# Patient Record
Sex: FEMALE | Race: WHITE | NOT HISPANIC OR LATINO | Employment: FULL TIME | URBAN - METROPOLITAN AREA
[De-identification: names, ages, dates, MRNs, and addresses within clinical notes are randomized per-mention and may not be internally consistent; named-entity substitution may affect disease eponyms.]

---

## 2017-02-13 ENCOUNTER — OFFICE VISIT (OUTPATIENT)
Dept: URGENT CARE | Facility: RETAIL CLINIC | Age: 47
End: 2017-02-13
Payer: COMMERCIAL

## 2017-02-13 VITALS
TEMPERATURE: 97.6 F | DIASTOLIC BLOOD PRESSURE: 76 MMHG | HEART RATE: 86 BPM | SYSTOLIC BLOOD PRESSURE: 124 MMHG | OXYGEN SATURATION: 98 %

## 2017-02-13 DIAGNOSIS — J02.9 ACUTE PHARYNGITIS, UNSPECIFIED ETIOLOGY: Primary | ICD-10-CM

## 2017-02-13 LAB — S PYO AG THROAT QL IA.RAPID: NORMAL

## 2017-02-13 PROCEDURE — 87081 CULTURE SCREEN ONLY: CPT | Performed by: PHYSICIAN ASSISTANT

## 2017-02-13 PROCEDURE — 99213 OFFICE O/P EST LOW 20 MIN: CPT | Performed by: PHYSICIAN ASSISTANT

## 2017-02-13 PROCEDURE — 87880 STREP A ASSAY W/OPTIC: CPT | Mod: QW | Performed by: PHYSICIAN ASSISTANT

## 2017-02-13 NOTE — NURSING NOTE
"Chief Complaint   Patient presents with     Pharyngitis     off and on for a couple of days       Initial /76  Pulse 86  Temp 97.6  F (36.4  C) (Oral)  SpO2 98% Estimated body mass index is 23.38 kg/(m^2) as calculated from the following:    Height as of 1/29/11: 5' 3\" (1.6 m).    Weight as of 12/23/16: 132 lb (59.9 kg).  Medication Reconciliation: complete   Stephanie Saenz      "

## 2017-02-13 NOTE — PATIENT INSTRUCTIONS
* PHARYNGITIS (Sore Throat),REPORT PENDING    Pharyngitis (sore throat) is often due to a virus, but can also be caused by the  strep  bacteria. This is called  strep throat . Both viral and strep infection can cause throat pain that is worse when swallowing, aching all over with headache and fever. Both types of infections are contagious. They may be spread by coughing, kissing or touching others after touching your mouth or nose, so wash your hands often.  A test has been done to determine whether or not you have strep throat. If it is positive for strep infection you will usually need to take antibiotics. If the test is negative, you probably have a viral pharyngitis, and antibiotic treatment will not help you recover.  HOME CARE:    If your symptoms are severe, rest at home for the first 2-3 days. If you are told that your test is positive for strep, you should be off work and school for the first two days of antibiotic treatment. After that, you will no longer be as contagious.    Children: Use acetaminophen (Tylenol) for fever, fussiness or discomfort. In infants over six months of age, you may use ibuprofen (Children's Motrin) instead of Tylenol. [NOTE: If your child has chronic liver or kidney disease or ever had a stomach ulcer or GI bleeding, talk with your doctor before using these medicines.]   (Aspirin should never be used in anyone under 18 years of age who is ill with a fever. It may cause severe liver damage.)  Adults: You may use acetaminophen (Tylenol) 650 1000 mg every 6 hours or ibuprofen (Motrin, Advil) 600 mg every 6 8 hours with food to control pain, if you are able to take these medicines. [NOTE: If you have chronic liver or kidney disease or ever had a stomach ulcer or GI bleeding, talk with your doctor before using these medicines.]    Throat lozenges or sprays (Chloraseptic and others), or gargling with warm salt water will reduce throat pain. Dissolve 1/2 teaspoon of salt in 1 glass of  warm water. This is especially useful just before meals.     FOLLOW UP with your doctor as advised by our staff if you are not improving over the next week.  GET PROMPT MEDICAL ATTENTION  if any of the following occur:    Fever over 101 F (38.3 C) for more than three days    New or worsening ear pain, sinus pain or headache    Unable to swallow liquids or open your mouth wide due to throat pain    Trouble breathing    Muffled voice    New rash       [id number] [facility], [address] [place] [address], [place] [address]. All rights reserved. This information is not intended as a substitute for professional medical care. Always follow your healthcare professional's instructions.    .............................      Please FOLLOW UP at primary care clinic if not improving, new symptoms, worse or this does not resolve.  [facility]  [phone]

## 2017-02-13 NOTE — PROGRESS NOTES
Chief Complaint   Patient presents with     Pharyngitis     off and on for a couple of days         SUBJECTIVE:   Pt. presenting to [facility] -  with a chief complaint of ST off and on. No URI symptoms or cough  Here with son.  Onset of symptoms gradual  Course of illness is same.    Severity mild  Current and Associated symptoms: sore throat and off and on  Treatment measures tried include None tried.  Predisposing factors include HX of Strep.  Last antibiotic Z pack -sinusitis 12/2016    Pregnancy no  Smoker no  No past medical history on file.  No past surgical history on file.  Patient Active Problem List   Diagnosis     Seasonal allergic rhinitis     Current Outpatient Prescriptions   Medication     fluticasone (FLONASE) 50 MCG/ACT nasal spray     Cyanocobalamin (VITAMIN B12 PO)     Cholecalciferol (VITAMIN D PO)     calcium carbonate 500 MG tablet     Multiple Vitamin (MULTI-VITAMIN) per tablet     No current facility-administered medications for this visit.      OBJECTIVE:  /76  Pulse 86  Temp 97.6  F (36.4  C) (Oral)  SpO2 98%    GENERAL APPEARANCE: cooperative, alert and no distress. Appears well hydrated.  EYES: conjunctiva clear  HENT: Rt ear canal  clear and TM normal   Lt ear canal clear and TM normal   Nose no congestion. no discharge  Mouth without ulcers or lesions. mild erythema. no exudate.   NECK: supple, no palp ant nodes. No  posterior nodes.  RESP: lungs clear to auscultation - no rales, rhonchi or wheezes. Breathing easily.  CV: regular rates and rhythm  ABDOMEN:  soft, nontender, no HSM or masses and bowel sounds normal   SKIN: no suspicious lesions or rashes  no tenderness to palpate over  sinus areas.    Rapid strep neg    ASSESSMENT:  Acute pharyngitis, unspecified etiology      PLAN:  Symptomatic measures   Throat culture pending - will be notified of positive results only.  Salt water gargles  -throat lozenges or honey/lemon tea if soothing   Cool mist vaporizer.    Stay in clean air environment.  > rest.  > fluids.  Contagiousness and hygiene discussed.  Fever and pain  control measures discussed.   If unable to swallow or any breathing difficulty to go to ED     Follow up with your primary care provider if not improving, anytime if worse and if symptoms do not resolve.    AVS given and discussed:  Patient Instructions      * PHARYNGITIS (Sore Throat),REPORT PENDING    Pharyngitis (sore throat) is often due to a virus, but can also be caused by the  strep  bacteria. This is called  strep throat . Both viral and strep infection can cause throat pain that is worse when swallowing, aching all over with headache and fever. Both types of infections are contagious. They may be spread by coughing, kissing or touching others after touching your mouth or nose, so wash your hands often.  A test has been done to determine whether or not you have strep throat. If it is positive for strep infection you will usually need to take antibiotics. If the test is negative, you probably have a viral pharyngitis, and antibiotic treatment will not help you recover.  HOME CARE:    If your symptoms are severe, rest at home for the first 2-3 days. If you are told that your test is positive for strep, you should be off work and school for the first two days of antibiotic treatment. After that, you will no longer be as contagious.    Children: Use acetaminophen (Tylenol) for fever, fussiness or discomfort. In infants over six months of age, you may use ibuprofen (Children's Motrin) instead of Tylenol. [NOTE: If your child has chronic liver or kidney disease or ever had a stomach ulcer or GI bleeding, talk with your doctor before using these medicines.]   (Aspirin should never be used in anyone under 18 years of age who is ill with a fever. It may cause severe liver damage.)  Adults: You may use acetaminophen (Tylenol) 650-1000 mg every 6 hours or ibuprofen (Motrin, Advil) 600 mg every 6-8 hours with  food to control pain, if you are able to take these medicines. [NOTE: If you have chronic liver or kidney disease or ever had a stomach ulcer or GI bleeding, talk with your doctor before using these medicines.]    Throat lozenges or sprays (Chloraseptic and others), or gargling with warm salt water will reduce throat pain. Dissolve 1/2 teaspoon of salt in 1 glass of warm water. This is especially useful just before meals.     FOLLOW UP with your doctor as advised by our staff if you are not improving over the next week.  GET PROMPT MEDICAL ATTENTION  if any of the following occur:    Fever over 101 F (38.3 C) for more than three days    New or worsening ear pain, sinus pain or headache    Unable to swallow liquids or open your mouth wide due to throat pain    Trouble breathing    Muffled voice    New rash       [id number] [facility], [address] [place] [address]. All rights reserved. This information is not intended as a substitute for professional medical care. Always follow your healthcare professional's instructions.    .............................      Please FOLLOW UP at primary care clinic if not improving, new symptoms, worse or this does not resolve.  [facility]  [phone]      PT is comfortable with this plan.  Electronically signed,  DON Ennis PAC

## 2017-02-13 NOTE — MR AVS SNAPSHOT
After Visit Summary   2/13/2017    Julia Flynn    MRN: [id number]           Patient Information     Date Of Birth          1970        Visit Information        Provider Department      2/13/2017 3:00 PM Myrna Ennis PA-C [facility]        Today's Diagnoses     Acute pharyngitis, unspecified etiology    -  1      Care Instructions       * PHARYNGITIS (Sore Throat),REPORT PENDING    Pharyngitis (sore throat) is often due to a virus, but can also be caused by the  strep  bacteria. This is called  strep throat . Both viral and strep infection can cause throat pain that is worse when swallowing, aching all over with headache and fever. Both types of infections are contagious. They may be spread by coughing, kissing or touching others after touching your mouth or nose, so wash your hands often.  A test has been done to determine whether or not you have strep throat. If it is positive for strep infection you will usually need to take antibiotics. If the test is negative, you probably have a viral pharyngitis, and antibiotic treatment will not help you recover.  HOME CARE:    If your symptoms are severe, rest at home for the first 2-3 days. If you are told that your test is positive for strep, you should be off work and school for the first two days of antibiotic treatment. After that, you will no longer be as contagious.    Children: Use acetaminophen (Tylenol) for fever, fussiness or discomfort. In infants over six months of age, you may use ibuprofen (Children's Motrin) instead of Tylenol. [NOTE: If your child has chronic liver or kidney disease or ever had a stomach ulcer or GI bleeding, talk with your doctor before using these medicines.]   (Aspirin should never be used in anyone under 18 years of age who is ill with a fever. It may cause severe liver damage.)  Adults: You may use acetaminophen (Tylenol) 650-1000 mg every 6 hours or ibuprofen (Motrin, Advil) 600 mg  every 6-8 hours with food to control pain, if you are able to take these medicines. [NOTE: If you have chronic liver or kidney disease or ever had a stomach ulcer or GI bleeding, talk with your doctor before using these medicines.]    Throat lozenges or sprays (Chloraseptic and others), or gargling with warm salt water will reduce throat pain. Dissolve 1/2 teaspoon of salt in 1 glass of warm water. This is especially useful just before meals.     FOLLOW UP with your doctor as advised by our staff if you are not improving over the next week.  GET PROMPT MEDICAL ATTENTION  if any of the following occur:    Fever over 101 F (38.3 C) for more than three days    New or worsening ear pain, sinus pain or headache    Unable to swallow liquids or open your mouth wide due to throat pain    Trouble breathing    Muffled voice    New rash       [id number] [facility], [address] [place] [address]. All rights reserved. This information is not intended as a substitute for professional medical care. Always follow your healthcare professional's instructions.    .............................      Please FOLLOW UP at primary care clinic if not improving, new symptoms, worse or this does not resolve.  [facility]  [phone]          Follow-ups after your visit        Who to contact     You can reach your care team any time of the day by calling [phone].  Notification of test results:  If you have an abnormal lab result, we will notify you by phone as soon as possible.         Additional Information About Your Visit        HeyStakshart Information     [email or web address] gives you secure access to your electronic health record. If you see a primary care provider, you can also send messages to your care team and make appointments. If you have questions, please call your primary care clinic.  If you do not have a primary care provider, please call [phone] and they will assist you.        Care EveryWhere ID      This is your Care EveryWhere ID. This could be used by other organizations to access your [place] medical records  [id number]        Your Vitals Were     Pulse Temperature Pulse Oximetry             86 97.6  F (36.4  C) (Oral) 98%          Blood Pressure from Last 3 Encounters:   02/13/17 124/76   12/23/16 106/72   07/21/16 104/60    Weight from Last 3 Encounters:   12/23/16 132 lb (59.9 kg)   07/21/16 132 lb (59.9 kg)   12/23/15 130 lb (59 kg)              We Performed the Following     BETA STREP GROUP A R/O CULTURE     RAPID STREP SCREEN        Primary Care Provider    Doctor Unknown, MD       No address on file        Thank you!     Thank you for choosing [facility]  for your care. Our goal is always to provide you with excellent care. Hearing back from our patients is one way we can continue to improve our services. Please take a few minutes to complete the written survey that you may receive in the mail after your visit with us. Thank you!             Your Updated Medication List - Protect others around you: Learn how to safely use, store and throw away your medicines at www.disposemymeds.org.          This list is accurate as of: 2/13/17  3:20 PM.  Always use your most recent med list.                   Brand Name Dispense Instructions for use    calcium carbonate 500 MG tablet    OS-STACEY 500 mg [place]. [place]     Take 1 tablet by mouth 2 times daily Reported on 2/13/2017       fluticasone 50 MCG/ACT spray    FLONASE    16 g    Spray 1-2 sprays into both nostrils daily       Multi-vitamin Tabs tablet   Generic drug:  multivitamin, therapeutic with minerals      Take 1 tablet by mouth daily Reported on 2/13/2017       VITAMIN B12 PO      Reported on 2/13/2017       VITAMIN D PO      Reported on 2/13/2017

## 2017-02-16 LAB — BETA STREP CONFIRM: NORMAL

## 2017-04-09 ENCOUNTER — OFFICE VISIT (OUTPATIENT)
Dept: URGENT CARE | Facility: RETAIL CLINIC | Age: 47
End: 2017-04-09
Payer: COMMERCIAL

## 2017-04-09 VITALS
OXYGEN SATURATION: 96 % | BODY MASS INDEX: 24.09 KG/M2 | DIASTOLIC BLOOD PRESSURE: 72 MMHG | TEMPERATURE: 98.8 F | RESPIRATION RATE: 16 BRPM | WEIGHT: 136 LBS | SYSTOLIC BLOOD PRESSURE: 109 MMHG | HEART RATE: 75 BPM

## 2017-04-09 DIAGNOSIS — J02.9 ACUTE PHARYNGITIS, UNSPECIFIED ETIOLOGY: ICD-10-CM

## 2017-04-09 DIAGNOSIS — J01.90 ACUTE SINUSITIS WITH COEXISTING CONDITION REQUIRING PROPHYLACTIC TREATMENT: Primary | ICD-10-CM

## 2017-04-09 DIAGNOSIS — J06.9 UPPER RESPIRATORY TRACT INFECTION, UNSPECIFIED TYPE: ICD-10-CM

## 2017-04-09 DIAGNOSIS — R09.81 NASAL CONGESTION: ICD-10-CM

## 2017-04-09 DIAGNOSIS — Z20.818 STREP THROAT EXPOSURE: ICD-10-CM

## 2017-04-09 LAB — S PYO AG THROAT QL IA.RAPID: NORMAL

## 2017-04-09 PROCEDURE — 99213 OFFICE O/P EST LOW 20 MIN: CPT | Performed by: PHYSICIAN ASSISTANT

## 2017-04-09 PROCEDURE — 87081 CULTURE SCREEN ONLY: CPT | Performed by: PHYSICIAN ASSISTANT

## 2017-04-09 PROCEDURE — 87880 STREP A ASSAY W/OPTIC: CPT | Mod: QW | Performed by: PHYSICIAN ASSISTANT

## 2017-04-09 RX ORDER — AZITHROMYCIN 250 MG/1
TABLET, FILM COATED ORAL
Qty: 6 TABLET | Refills: 0 | Status: SHIPPED | OUTPATIENT
Start: 2017-04-09 | End: 2017-10-05

## 2017-04-09 RX ORDER — FLUTICASONE PROPIONATE 50 MCG
1-2 SPRAY, SUSPENSION (ML) NASAL DAILY
Qty: 1 BOTTLE | Refills: 1 | Status: SHIPPED | OUTPATIENT
Start: 2017-04-09 | End: 2017-11-15

## 2017-04-09 ASSESSMENT — PAIN SCALES - GENERAL: PAINLEVEL: MODERATE PAIN (5)

## 2017-04-09 NOTE — NURSING NOTE
"Chief Complaint   Patient presents with     Pharyngitis     1 week     Cough     Sinus Problem       Initial /72  Pulse 75  Temp 98.8  F (37.1  C) (Tympanic)  Resp 16  Wt 136 lb (61.7 kg)  SpO2 96%  BMI 24.09 kg/m2 Estimated body mass index is 24.09 kg/(m^2) as calculated from the following:    Height as of 1/29/11: 5' 3\" (1.6 m).    Weight as of this encounter: 136 lb (61.7 kg).  Medication Reconciliation: complete    "

## 2017-04-09 NOTE — PROGRESS NOTES
"  Chief Complaint   Patient presents with     Pharyngitis     1 week     Cough     Sinus Problem         SUBJECTIVE:   Pt. presenting to [facility] -  with a chief complaint of ST. Minimal cough. URI symptoms with > sinus HA and pressure.  Hx of asthma no.  Onset of symptoms gradual  Course of illness is worsening.    Severity moderate  Current and Associated symptoms: nasal congestion, \"cold symptoms\", sore throat, facial pain/pressure, headache and malaise  Treatment measures tried include Fluids, OTC meds and Rest.  Predisposing factors include strep exposure.  Last antibiotic 12/2016    Pregnancy no  Smoker no  No past medical history on file.  No past surgical history on file.  Patient Active Problem List   Diagnosis     Seasonal allergic rhinitis     Current Outpatient Prescriptions   Medication     fluticasone (FLONASE) 50 MCG/ACT nasal spray     Cyanocobalamin (VITAMIN B12 PO)     Cholecalciferol (VITAMIN D PO)     calcium carbonate 500 MG tablet     Multiple Vitamin (MULTI-VITAMIN) per tablet     No current facility-administered medications for this visit.      OBJECTIVE:  /72  Pulse 75  Temp 98.8  F (37.1  C) (Tympanic)  Resp 16  Wt 136 lb (61.7 kg)  SpO2 96%  BMI 24.09 kg/m2    GENERAL APPEARANCE: cooperative, alert and no distress. Appears well hydrated.  EYES: conjunctiva clear  HENT: Rt ear canal  clear and TM normal   Lt ear canal clear and TM normal   Nose mod congestion. thick discharge  Mouth without ulcers or lesions. mild erythema. no exudate.  NECK: supple, few small shoddy NT ant nodes. No  posterior nodes.  RESP: lungs clear to auscultation - no rales, rhonchi or wheezes. Breathing easily.  CV: regular rates and rhythm  ABDOMEN:  soft, nontender, no HSM or masses and bowel sounds normal   SKIN: no suspicious lesions or rashes  some tenderness to palpate over sarah max sinus areas.    Rapid strep neg    ASSESSMENT:     Acute pharyngitis, unspecified etiology  Upper " respiratory tract infection, unspecified type  Strep throat exposure  Acute sinusitis with coexisting condition requiring prophylactic treatment  Nasal congestion      PLAN:  Symptomatic measures   Prescriptions as below. Discussed indications, dosing, side affects and adverse reactions of medications with  Patient -Zpack -tolerated in past (no history of arrhythmias) and restart Flonase  Eat yogurt daily or take a probiotic supplement when on antibiotics.  OTC cough suppressant/expectorant discussed  Salt water gargles  -throat lozenges or honey/lemon tea if soothing   saline nasal spray for  nasal congestion   Cool mist vaporizer.   Stay in clean air environment.  > rest.  > fluids.  Contagiousness and hygiene discussed.  Fever and pain  control measures discussed.   If unable to swallow or any breathing difficulty to go to ED   AVS given and discussed:  Patient Instructions     Please FOLLOW UP at primary care clinic if not improving, new symptoms, worse or this does not resolve.          Pt is comfortable with this plan.  Electronically signed,  DON Ennis, PAC

## 2017-04-09 NOTE — PATIENT INSTRUCTIONS
Please FOLLOW UP at primary care clinic if not improving, new symptoms, worse or this does not resolve.

## 2017-04-09 NOTE — MR AVS SNAPSHOT
After Visit Summary   4/9/2017    Julia Flynn    MRN: [id number]           Patient Information     Date Of Birth          1970        Visit Information        Provider Department      4/9/2017 9:00 AM Myrna Ennis PA-C [facility]        Today's Diagnoses     Acute sinusitis with coexisting condition requiring prophylactic treatment    -  1    Acute pharyngitis, unspecified etiology        Upper respiratory tract infection, unspecified type        Strep throat exposure        Nasal congestion          Care Instructions      Please FOLLOW UP at primary care clinic if not improving, new symptoms, worse or this does not resolve.          Follow-ups after your visit        Who to contact     You can reach your care team any time of the day by calling [phone].  Notification of test results:  If you have an abnormal lab result, we will notify you by phone as soon as possible.         Additional Information About Your Visit        MyChart Information     [employer] gives you secure access to your electronic health record. If you see a primary care provider, you can also send messages to your care team and make appointments. If you have questions, please call your primary care clinic.  If you do not have a primary care provider, please call [phone] and they will assist you.        Care EveryWhere ID     This is your Care EveryWhere ID. This could be used by other organizations to access your [place] medical records  [id number]        Your Vitals Were     Pulse Temperature Respirations Pulse Oximetry BMI (Body Mass Index)       75 98.8  F (37.1  C) (Tympanic) 16 96% 24.09 kg/m2        Blood Pressure from Last 3 Encounters:   04/09/17 109/72   02/13/17 124/76   12/23/16 106/72    Weight from Last 3 Encounters:   04/09/17 136 lb (61.7 kg)   12/23/16 132 lb (59.9 kg)   07/21/16 132 lb (59.9 kg)              We Performed the Following     BETA STREP GROUP A R/O CULTURE      RAPID STREP SCREEN          Today's Medication Changes          These changes are accurate as of: 4/9/17  9:26 AM.  If you have any questions, ask your nurse or doctor.               Start taking these medicines.        Dose/Directions    azithromycin 250 MG tablet   Commonly known as:  ZITHROMAX   Used for:  Strep throat exposure, Acute sinusitis with coexisting condition requiring prophylactic treatment   Started by:  Myrna Ennis PA-C        Two tablets first day, then one tablet daily for four days.   Quantity:  6 tablet   Refills:  0         These medicines have changed or have updated prescriptions.        Dose/Directions    * fluticasone 50 MCG/ACT spray   Commonly known as:  FLONASE   This may have changed:  Another medication with the same name was added. Make sure you understand how and when to take each.   Used for:  Nasal sinus congestion   Changed by:  Myrna Ennis PA-C        Dose:  1-2 spray   Spray 1-2 sprays into both nostrils daily   Quantity:  16 g   Refills:  1       * fluticasone 50 MCG/ACT spray   Commonly known as:  FLONASE   This may have changed:  You were already taking a medication with the same name, and this prescription was added. Make sure you understand how and when to take each.   Used for:  Nasal congestion   Changed by:  Myrna Ennis PA-C        Dose:  1-2 spray   Spray 1-2 sprays into both nostrils daily   Quantity:  1 Bottle   Refills:  1       * Notice:  This list has 2 medication(s) that are the same as other medications prescribed for you. Read the directions carefully, and ask your doctor or other care provider to review them with you.         Where to get your medicines      These medications were sent to [facility] - [place], [place] - [address]  [address], [facility] [address]     Phone:  [phone]     azithromycin 250 MG tablet    fluticasone 50 MCG/ACT spray                Primary Care Provider    Doctor Unknown, MD       No address on  file        Thank you!     Thank you for choosing [facility]  for your care. Our goal is always to provide you with excellent care. Hearing back from our patients is one way we can continue to improve our services. Please take a few minutes to complete the written survey that you may receive in the mail after your visit with us. Thank you!             Your Updated Medication List - Protect others around you: Learn how to safely use, store and throw away your medicines at www.disposemymeds.org.          This list is accurate as of: 4/9/17  9:26 AM.  Always use your most recent med list.                   Brand Name Dispense Instructions for use    azithromycin 250 MG tablet    ZITHROMAX    6 tablet    Two tablets first day, then one tablet daily for four days.       calcium carbonate 500 MG tablet    OS-STACEY 500 mg [place]. [place]     Take 1 tablet by mouth 2 times daily Reported on 2/13/2017       * fluticasone 50 MCG/ACT spray    FLONASE    16 g    Spray 1-2 sprays into both nostrils daily       * fluticasone 50 MCG/ACT spray    FLONASE    1 Bottle    Spray 1-2 sprays into both nostrils daily       Multi-vitamin Tabs tablet   Generic drug:  multivitamin, therapeutic with minerals      Take 1 tablet by mouth daily Reported on 2/13/2017       VITAMIN B12 PO      Reported on 2/13/2017       VITAMIN D PO      Reported on 2/13/2017       * Notice:  This list has 2 medication(s) that are the same as other medications prescribed for you. Read the directions carefully, and ask your doctor or other care provider to review them with you.

## 2017-04-11 LAB — BETA STREP CONFIRM: NORMAL

## 2017-09-03 ENCOUNTER — HEALTH MAINTENANCE LETTER (OUTPATIENT)
Age: 47
End: 2017-09-03

## 2017-10-05 ENCOUNTER — OFFICE VISIT (OUTPATIENT)
Dept: URGENT CARE | Facility: RETAIL CLINIC | Age: 47
End: 2017-10-05
Payer: COMMERCIAL

## 2017-10-05 VITALS
SYSTOLIC BLOOD PRESSURE: 111 MMHG | OXYGEN SATURATION: 97 % | TEMPERATURE: 98.6 F | DIASTOLIC BLOOD PRESSURE: 76 MMHG | HEART RATE: 77 BPM

## 2017-10-05 DIAGNOSIS — J02.9 ACUTE PHARYNGITIS, UNSPECIFIED ETIOLOGY: Primary | ICD-10-CM

## 2017-10-05 DIAGNOSIS — R09.81 NASAL CONGESTION: ICD-10-CM

## 2017-10-05 DIAGNOSIS — J01.90 ACUTE SINUSITIS WITH COEXISTING CONDITION REQUIRING PROPHYLACTIC TREATMENT: ICD-10-CM

## 2017-10-05 LAB — S PYO AG THROAT QL IA.RAPID: NORMAL

## 2017-10-05 PROCEDURE — 99213 OFFICE O/P EST LOW 20 MIN: CPT | Performed by: NURSE PRACTITIONER

## 2017-10-05 PROCEDURE — 87081 CULTURE SCREEN ONLY: CPT | Performed by: NURSE PRACTITIONER

## 2017-10-05 PROCEDURE — 87880 STREP A ASSAY W/OPTIC: CPT | Mod: QW | Performed by: NURSE PRACTITIONER

## 2017-10-05 RX ORDER — AZITHROMYCIN 250 MG/1
TABLET, FILM COATED ORAL
Qty: 6 TABLET | Refills: 0 | Status: SHIPPED | OUTPATIENT
Start: 2017-10-05 | End: 2017-10-10

## 2017-10-05 NOTE — PROGRESS NOTES
[facility] clinic note    SUBJECTIVE:    Julia Flynn is a 47 year old female who presents to [facility] clinic with the following symptoms:  Facial pain for seven days or more  Purulent nasal discharge  Failure of over-the-counter nasal decongestants or other medications  Headache  History of sinusitis in the past  Mild to moderate facial swelling  Pain in the teeth or near a certain tooth  Eyes burning  Chills  Cough productive  Felt feverish  Malaise  Chest congestion    The patient denies a history of:  Fever >102.5  Orbital pain  Periorbital swelling or erythema  Severe facial swelling or erythema  Severe neck pain  Vision changes    PAST MEDICAL HISTORY: No past medical history on file.    PAST SURGICAL HISTORY: No past surgical history on file.    FAMILY HISTORY: No family history on file.    SOCIAL HISTORY:   Social History   Substance Use Topics     Smoking status: Never Smoker     Smokeless tobacco: Never Used     Alcohol use No       Current Outpatient Prescriptions   Medication     GuaiFENesin (MUCINEX PO)     Pseudoephedrine-DM-GG (ROBITUSSIN CF PO)     Cyanocobalamin (VITAMIN B12 PO)     Cholecalciferol (VITAMIN D PO)     calcium carbonate 500 MG tablet     Multiple Vitamin (MULTI-VITAMIN) per tablet     fluticasone (FLONASE) 50 MCG/ACT spray     fluticasone (FLONASE) 50 MCG/ACT nasal spray     No current facility-administered medications for this visit.        OBJECTIVE:  This patient appears today in in moderate distress.  Vitals:    10/05/17 0915   BP: 111/76   Pulse: 77   Temp: 98.6  F (37  C)   TempSrc: Oral   SpO2: 97%     HEENT:  Facial tenderness located over the maxillary sinus region       Tenderness is bilateral.  Purulent nasal discharge present from both sides.  Tympanic membranes are clear and without bulging or erythema  Extraoccular movements are free and intact  Sclera, cornea and conjunctiva are clear  No proptosis  No significant  periorbital edema or erythema  Throat is clear without significant erythema, tonsillar swelling or exudates  NECK:  Soft and supple without significant tenderness or adenopathy  RESP:  Clear to auscultation without wheezing, rales or ronchi    ASSESSMENT:     Acute pharyngitis, unspecified etiology  Nasal congestion  Acute sinusitis with coexisting condition requiring prophylactic treatment      PLAN:  Zpack  Afrin nasal spray as needed for up to 3 days.  Apply warm facial packs for 5-10 minutes three times daily.  Drink plenty of fluids- 6 to 10 glasses of liquids each day.  Elevate head of the bed.  Increase the humidity level in the home.  Rest.  Saline drops or nasal sprays as needed.  Take warm, steamy showers to reduce congestion.  Tylenol or ibuprofen as needed for discomfort.  Contact primary care clinic for increasing pain, high fever, vision changes, worsening symptoms, or no relief from symptoms after 7-10 days.  May drink tea /c honey to sooth throat.    Symptomatic treatment or other home remedies as discussed & reviewed.   Use of a nasal flush discussed with Julia Flynn as a good treatment option.   OTC Mucinex (or generic) may help to loosen congestion as well.  Rest as needed.  Avoid items which you are or maybe allergic.  Add moisture to the air with a humidifier or a vaporizer &/or inhale steam from a basin of hot water or shower.  Follow up at:  [facility] [phone]    Easton Stubbs MSN, APRN, Family NP-C  [facility]

## 2017-10-05 NOTE — MR AVS SNAPSHOT
After Visit Summary   10/5/2017    Julia Flynn    MRN: [id number]           Patient Information     Date Of Birth          1970        Visit Information        Provider Department      10/5/2017 9:10 AM Easton Stubbs APRN [facility]        Today's Diagnoses     Acute pharyngitis, unspecified etiology    -  1    Nasal congestion        Acute sinusitis with coexisting condition requiring prophylactic treatment           Follow-ups after your visit        Who to contact     You can reach your care team any time of the day by calling [phone].  Notification of test results:  If you have an abnormal lab result, we will notify you by phone as soon as possible.         Additional Information About Your Visit        MyChart Information     [employer] gives you secure access to your electronic health record. If you see a primary care provider, you can also send messages to your care team and make appointments. If you have questions, please call your primary care clinic.  If you do not have a primary care provider, please call [phone] and they will assist you.        Care EveryWhere ID     This is your Care EveryWhere ID. This could be used by other organizations to access your [place] medical records  [id number]        Your Vitals Were     Pulse Temperature Pulse Oximetry             77 98.6  F (37  C) (Oral) 97%          Blood Pressure from Last 3 Encounters:   10/05/17 111/76   04/09/17 109/72   02/13/17 124/76    Weight from Last 3 Encounters:   04/09/17 136 lb (61.7 kg)   12/23/16 132 lb (59.9 kg)   07/21/16 132 lb (59.9 kg)              We Performed the Following     BETA STREP GROUP A R/O CULTURE     RAPID STREP SCREEN          Today's Medication Changes          These changes are accurate as of: 10/5/17  9:44 AM.  If you have any questions, ask your nurse or doctor.               Start taking these medicines.        Dose/Directions    azithromycin 250 MG  tablet   Commonly known as:  ZITHROMAX   Used for:  Acute sinusitis with coexisting condition requiring prophylactic treatment   Started by:  Easton Stubbs, MARIANNE CNP        Take 2 tablets today, then take 1 tablet for the next 4 days.   Quantity:  6 tablet   Refills:  0            Where to get your medicines      These medications were sent to [facility] - [place], [place] - [address]  [address], [facility] [address]     Phone:  [phone]     azithromycin 250 MG tablet                Primary Care Provider Office Phone # Fax #    [place] [facility] [phone] [address] [facility] [address]        Equal Access to Services     [facility] HospitalEST[facility]O : Hadii tiarra guidry hadalondra Hdez, waaxda luqadaha, qaybta kaalmada adedianayada, magaly handy . So [facility] [phone].    ATENCIÓN: Si habla español, tiene a andino disposición servicios gratuitos de asistencia lingüística. [facility] [phone].    We comply with applicable federal civil rights laws and [place] laws. We do not discriminate on the basis of race, color, national origin, age, disability, sex, sexual orientation, or gender identity.            Thank you!     Thank you for choosing [facility]  for your care. Our goal is always to provide you with excellent care. Hearing back from our patients is one way we can continue to improve our services. Please take a few minutes to complete the written survey that you may receive in the mail after your visit with us. Thank you!             Your Updated Medication List - Protect others around you: Learn how to safely use, store and throw away your medicines at www.disposemymeds.org.          This list is accurate as of: 10/5/17  9:44 AM.  Always use your most recent med list.                   Brand Name Dispense Instructions for use Diagnosis    azithromycin 250 MG tablet    ZITHROMAX    6 tablet    Take 2 tablets today, then take 1  tablet for the next 4 days.    Acute sinusitis with coexisting condition requiring prophylactic treatment       calcium carbonate 1250 MG tablet    OS-STACEY 500 mg [place]. [place]     Take 1 tablet by mouth 2 times daily Reported on 2/13/2017        * fluticasone 50 MCG/ACT spray    FLONASE    16 g    Spray 1-2 sprays into both nostrils daily    Nasal sinus congestion       * fluticasone 50 MCG/ACT spray    FLONASE    1 Bottle    Spray 1-2 sprays into both nostrils daily    Nasal congestion       MUCINEX PO           Multi-vitamin Tabs tablet   Generic drug:  multivitamin, therapeutic with minerals      Take 1 tablet by mouth daily Reported on 2/13/2017        ROBITUSSIN CF PO           VITAMIN B12 PO      Reported on 2/13/2017        VITAMIN D PO      Reported on 2/13/2017        * Notice:  This list has 2 medication(s) that are the same as other medications prescribed for you. Read the directions carefully, and ask your doctor or other care provider to review them with you.

## 2017-10-05 NOTE — NURSING NOTE
"Chief Complaint   Patient presents with     Cough     x about a week     Sinus Problem     Pharyngitis       Initial /76  Pulse 77  Temp 98.6  F (37  C) (Oral)  SpO2 97% Estimated body mass index is 24.09 kg/(m^2) as calculated from the following:    Height as of 1/29/11: 5' 3\" (1.6 m).    Weight as of 4/9/17: 136 lb (61.7 kg).  Medication Reconciliation: complete     Stephanie Saenz      "

## 2017-10-07 LAB — BETA STREP CONFIRM: NORMAL

## 2017-11-02 ENCOUNTER — OFFICE VISIT (OUTPATIENT)
Dept: URGENT CARE | Facility: RETAIL CLINIC | Age: 47
End: 2017-11-02
Payer: COMMERCIAL

## 2017-11-02 VITALS
TEMPERATURE: 98 F | SYSTOLIC BLOOD PRESSURE: 106 MMHG | HEART RATE: 91 BPM | DIASTOLIC BLOOD PRESSURE: 72 MMHG | OXYGEN SATURATION: 98 %

## 2017-11-02 DIAGNOSIS — R05.9 COUGH: ICD-10-CM

## 2017-11-02 DIAGNOSIS — J02.9 ACUTE PHARYNGITIS, UNSPECIFIED ETIOLOGY: Primary | ICD-10-CM

## 2017-11-02 LAB — S PYO AG THROAT QL IA.RAPID: NORMAL

## 2017-11-02 PROCEDURE — 99213 OFFICE O/P EST LOW 20 MIN: CPT | Performed by: NURSE PRACTITIONER

## 2017-11-02 PROCEDURE — 87081 CULTURE SCREEN ONLY: CPT | Performed by: NURSE PRACTITIONER

## 2017-11-02 PROCEDURE — 87880 STREP A ASSAY W/OPTIC: CPT | Mod: QW | Performed by: NURSE PRACTITIONER

## 2017-11-02 NOTE — MR AVS SNAPSHOT
After Visit Summary   11/2/2017    Julia Flynn    MRN: [id number]           Patient Information     Date Of Birth          1970        Visit Information        Provider Department      11/2/2017 6:40 PM Easton Stubbs APRN [facility]        Today's Diagnoses     Acute pharyngitis, unspecified etiology    -  1    Cough           Follow-ups after your visit        Who to contact     You can reach your care team any time of the day by calling [phone].  Notification of test results:  If you have an abnormal lab result, we will notify you by phone as soon as possible.         Additional Information About Your Visit        MyChart Information     [employer] gives you secure access to your electronic health record. If you see a primary care provider, you can also send messages to your care team and make appointments. If you have questions, please call your primary care clinic.  If you do not have a primary care provider, please call [phone] and they will assist you.        Care EveryWhere ID     This is your Care EveryWhere ID. This could be used by other organizations to access your [place] medical records  [id number]        Your Vitals Were     Pulse Temperature Pulse Oximetry             91 98  F (36.7  C) (Oral) 98%          Blood Pressure from Last 3 Encounters:   11/02/17 106/72   10/05/17 111/76   04/09/17 109/72    Weight from Last 3 Encounters:   04/09/17 136 lb (61.7 kg)   12/23/16 132 lb (59.9 kg)   07/21/16 132 lb (59.9 kg)              We Performed the Following     BETA STREP GROUP A R/O CULTURE     RAPID STREP SCREEN        Primary Care Provider Office Phone # Fax #    [facility] [phone] [address] [facility] [address]        Equal Access to Services     GEOFF AYALA : Trudy Hdez, stephen rouse, ivy patel, magaly blackwell. So [facility]  [phone].    ATENCIÓN: Si aldo coe, tiene a andino disposición servicios gratuitos de asistencia lingüística. Roro tejeda [phone].    We comply with applicable federal civil rights laws and [place] laws. We do not discriminate on the basis of race, color, national origin, age, disability, sex, sexual orientation, or gender identity.            Thank you!     Thank you for choosing [facility]  for your care. Our goal is always to provide you with excellent care. Hearing back from our patients is one way we can continue to improve our services. Please take a few minutes to complete the written survey that you may receive in the mail after your visit with us. Thank you!             Your Updated Medication List - Protect others around you: Learn how to safely use, store and throw away your medicines at www.disposemymeds.org.          This list is accurate as of: 11/2/17  7:33 PM.  Always use your most recent med list.                   Brand Name Dispense Instructions for use Diagnosis    calcium carbonate 1250 MG tablet    OS-STACEY 500 mg [place]. Ca     Take 1 tablet by mouth 2 times daily Reported on 2/13/2017        * fluticasone 50 MCG/ACT spray    FLONASE    16 g    Spray 1-2 sprays into both nostrils daily    Nasal sinus congestion       * fluticasone 50 MCG/ACT spray    FLONASE    1 Bottle    Spray 1-2 sprays into both nostrils daily    Nasal congestion       MUCINEX PO           Multi-vitamin Tabs tablet   Generic drug:  multivitamin, therapeutic with minerals      Take 1 tablet by mouth daily Reported on 2/13/2017        ROBITUSSIN CF PO           VITAMIN B12 PO      Reported on 2/13/2017        VITAMIN D PO      Reported on 2/13/2017        * Notice:  This list has 2 medication(s) that are the same as other medications prescribed for you. Read the directions carefully, and ask your doctor or other care provider to review them with you.

## 2017-11-03 NOTE — PROGRESS NOTES
[facility] clinic note    SUBJECTIVE:  Julia Flynn is a 47 year old female who presents to [facility] clinic with chief complaint of sore throat/cough.    Onset of symptoms was 3 week(s) ago.    Course of illness: gradual onset and worsening.    Severity moderate  Course of illness:  Current and Associated symptoms: chills, sweats, runny nose, stuffy nose, cough - non-productive, sore throat, hoarse voice, facial pain/pressure, headache, body aches, fatigue and 1 day of stomach upset.  Treatment measures tried at home include OTC Cough med.  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    Current Outpatient Prescriptions   Medication     GuaiFENesin (MUCINEX PO)     Pseudoephedrine-DM-GG (ROBITUSSIN CF PO)     fluticasone (FLONASE) 50 MCG/ACT spray     fluticasone (FLONASE) 50 MCG/ACT nasal spray     Cyanocobalamin (VITAMIN B12 PO)     Cholecalciferol (VITAMIN D PO)     calcium carbonate 500 MG tablet     Multiple Vitamin (MULTI-VITAMIN) per tablet     No current facility-administered medications for this visit.      PAST MEDICAL HISTORY: No past medical history on file.    PAST SURGICAL HISTORY: No past surgical history on file.    FAMILY HISTORY: No family history on file.    SOCIAL HISTORY:   Social History   Substance Use Topics     Smoking status: Never Smoker     Smokeless tobacco: Never Used     Alcohol use No       ROS:  Review of systems negative except as stated above.    OBJECTIVE:   Vitals:    11/02/17 1907   BP: 106/72   Pulse: 91   Temp: 98  F (36.7  C)   TempSrc: Oral   SpO2: 98%     GENERAL APPEARANCE: alert, active, mild distress, moderate distress and cooperative  EYES: EOMI,  PERRL, conjunctiva clear  HENT: ear canals and TM's normal.  Nose normal.  oral mucous membranes moist, no erythema noted  NECK: supple, non-tender to palpation, no adenopathy noted  RESP: lungs clear to auscultation - no rales, rhonchi or wheezes  CV:  regular rates and rhythm, normal S1 S2, no murmur noted  ABDOMEN:  soft, nontender, no HSM or masses and bowel sounds normal  SKIN: no suspicious lesions or rashes    Rapid Strep test is negative; await throat culture results.    ASSESSMENT:     Acute pharyngitis, unspecified etiology  Cough      PLAN:   Outpatient Encounter Prescriptions as of 11/2/2017   Medication Sig Dispense Refill     GuaiFENesin (MUCINEX PO)        Pseudoephedrine-DM-GG (ROBITUSSIN CF PO)        fluticasone (FLONASE) 50 MCG/ACT spray Spray 1-2 sprays into both nostrils daily 1 Bottle 1     fluticasone (FLONASE) 50 MCG/ACT nasal spray Spray 1-2 sprays into both nostrils daily 16 g 1     Cyanocobalamin (VITAMIN B12 PO) Reported on 2/13/2017       Cholecalciferol (VITAMIN D PO) Reported on 2/13/2017       calcium carbonate 500 MG tablet Take 1 tablet by mouth 2 times daily Reported on 2/13/2017       Multiple Vitamin (MULTI-VITAMIN) per tablet Take 1 tablet by mouth daily Reported on 2/13/2017       No facility-administered encounter medications on file as of 11/2/2017.      If not improving Follow up at:  [facility] [phone]  Encourage good hydration (mainly water), may drink tea /c honey, warm chicken broth to sooth throat.  Soft foods may be preferred for several days.  Symptomatic treatment with warm Na+ H2O gargles, OTC analgesic, etc. discussed.   Strep culture pending.   Julia Flynn told positive cultures called only.  Rest as needed.  Follow-up with primary care provider if not improving.    If difficulty breathing or swallowing be seen immediately in the ED.    Easton Stubbs MSN, APRN, Family NP-C  [facility]

## 2017-11-03 NOTE — NURSING NOTE
"Chief Complaint   Patient presents with     Cough     x 3 weeks     Pharyngitis       Initial /72  Pulse 91  Temp 98  F (36.7  C) (Oral)  SpO2 98% Estimated body mass index is 24.09 kg/(m^2) as calculated from the following:    Height as of 1/29/11: 5' 3\" (1.6 m).    Weight as of 4/9/17: 136 lb (61.7 kg).  Medication Reconciliation: complete   Stephanie Saenz      "

## 2017-11-04 LAB — BETA STREP CONFIRM: NORMAL

## 2017-11-15 ENCOUNTER — OFFICE VISIT (OUTPATIENT)
Dept: FAMILY MEDICINE | Facility: CLINIC | Age: 47
End: 2017-11-15
Payer: COMMERCIAL

## 2017-11-15 VITALS
DIASTOLIC BLOOD PRESSURE: 60 MMHG | SYSTOLIC BLOOD PRESSURE: 120 MMHG | HEART RATE: 80 BPM | TEMPERATURE: 97.7 F | BODY MASS INDEX: 23.72 KG/M2 | WEIGHT: 133.9 LBS

## 2017-11-15 DIAGNOSIS — J20.9 ACUTE BRONCHITIS, UNSPECIFIED ORGANISM: Primary | ICD-10-CM

## 2017-11-15 PROCEDURE — 99203 OFFICE O/P NEW LOW 30 MIN: CPT | Performed by: NURSE PRACTITIONER

## 2017-11-15 RX ORDER — ALBUTEROL SULFATE 90 UG/1
2 AEROSOL, METERED RESPIRATORY (INHALATION) EVERY 6 HOURS PRN
Qty: 1 INHALER | Refills: 0 | Status: SHIPPED | OUTPATIENT
Start: 2017-11-15 | End: 2017-12-05

## 2017-11-15 RX ORDER — BENZONATATE 200 MG/1
200 CAPSULE ORAL 3 TIMES DAILY PRN
Qty: 30 CAPSULE | Refills: 0 | Status: SHIPPED | OUTPATIENT
Start: 2017-11-15 | End: 2017-12-05

## 2017-11-15 NOTE — MR AVS SNAPSHOT
After Visit Summary   11/15/2017    Julia Flynn    MRN: [id number]           Patient Information     Date Of Birth          1970        Visit Information        Provider Department      11/15/2017 2:45 PM Lizette Jeffers APRN CNP [facility]        Today's Diagnoses     Acute bronchitis, unspecified organism    -  1       Follow-ups after your visit        Your next 10 appointments already scheduled     Dec 05, 2017  4:45 PM CST   PHYSICAL with MARIANNE Izaguirre C[facility]   [facility]    [address] [place] [address] [id number] [phone]              Who to contact     If you have questions or need follow up information about today's clinic visit or your schedule please contact [facility] directly at [phone].  Normal or non-critical lab and imaging results will be communicated to you by MyChart, letter or phone within 4 business days after the clinic has received the results. If you do not hear from us within 7 days, please contact the clinic through MyChart or phone. If you have a critical or abnormal lab result, we will notify you by phone as soon as possible.  Submit refill requests through [employer] or call your pharmacy and they will forward the refill request to us. Please allow 3 business days for your refill to be completed.          Additional Information About Your Visit        MyChart Information     [employer] gives you secure access to your electronic health record. If you see a primary care provider, you can also send messages to your care team and make appointments. If you have questions, please call your primary care clinic.  If you do not have a primary care provider, please call [phone] and they will assist you.        Care EveryWhere ID     This is your Care EveryWhere ID. This could be used by other organizations to access your [place] medical records  [id number]        Your  Vitals Were     Pulse Temperature BMI (Body Mass Index)             80 97.7  F (36.5  C) (Tympanic) 23.72 kg/m2          Blood Pressure from Last 3 Encounters:   11/15/17 120/60   11/02/17 106/72   10/05/17 111/76    Weight from Last 3 Encounters:   11/15/17 133 lb 14.4 oz (60.7 kg)   04/09/17 136 lb (61.7 kg)   12/23/16 132 lb (59.9 kg)              Today, you had the following     No orders found for display         Today's Medication Changes          These changes are accurate as of: 11/15/17  3:21 PM.  If you have any questions, ask your nurse or doctor.               Start taking these medicines.        Dose/Directions    albuterol 108 (90 BASE) MCG/ACT Inhaler   Commonly known as:  PROAIR HFA/PROVENTIL HFA/VENTOLIN HFA   Used for:  Acute bronchitis, unspecified organism   Started by:  Lizette Jeffers APRN CNP        Dose:  2 puff   Inhale 2 puffs into the lungs every 6 hours as needed for shortness of breath / dyspnea or wheezing   Quantity:  1 Inhaler   Refills:  0       benzonatate 200 MG capsule   Commonly known as:  TESSALON   Used for:  Acute bronchitis, unspecified organism   Started by:  Lizette Jeffers APRN CNP        Dose:  200 mg   Take 1 capsule (200 mg) by mouth 3 times daily as needed for cough   Quantity:  30 capsule   Refills:  0            Where to get your medicines      These medications were [address]  [address] [facility] [address]     Phone:  [phone]     albuterol 108 (90 BASE) MCG/ACT Inhaler    benzonatate 200 MG capsule                Primary Care Provider Office Phone # Fax #    [place] [facility] [phone]       [address] [facility] [address]        Equal Access to Services     GEOFF AYALA AH: Trudy Hdez, stephen rouse, qabrunilda kaalana patel, magaly blackwell. So [facility] [phone].    ATENCIÓN: Si habla español, tiene a andino disposición servicios  nubia de asistencia lingüística. Roro tejeda [phone].    We comply with applicable federal civil rights laws and [place] laws. We do not discriminate on the basis of race, color, national origin, age, disability, sex, sexual orientation, or gender identity.            Thank you!     Thank you for choosing [facility]  for your care. Our goal is always to provide you with excellent care. Hearing back from our patients is one way we can continue to improve our services. Please take a few minutes to complete the written survey that you may receive in the mail after your visit with us. Thank you!             Your Updated Medication List - Protect others around you: Learn how to safely use, store and throw away your medicines at www.disposemymeds.org.          This list is accurate as of: 11/15/17  3:21 PM.  Always use your most recent med list.                   Brand Name Dispense Instructions for use Diagnosis    albuterol 108 (90 BASE) MCG/ACT Inhaler    PROAIR HFA/PROVENTIL HFA/VENTOLIN HFA    1 Inhaler    Inhale 2 puffs into the lungs every 6 hours as needed for shortness of breath / dyspnea or wheezing    Acute bronchitis, unspecified organism       benzonatate 200 MG capsule    TESSALON    30 capsule    Take 1 capsule (200 mg) by mouth 3 times daily as needed for cough    Acute bronchitis, unspecified organism       calcium carbonate 1250 MG tablet    OS-STACEY 500 mg [place]. Ca     Take 1 tablet by mouth 2 times daily Reported on 2/13/2017        MUCINEX PO           Multi-vitamin Tabs tablet   Generic drug:  multivitamin, therapeutic with minerals      Take 1 tablet by mouth daily Reported on 2/13/2017        ROBITUSSIN CF PO           VITAMIN B12 PO      Reported on 2/13/2017        VITAMIN D PO      Reported on 2/13/2017

## 2017-11-15 NOTE — NURSING NOTE
"Chief Complaint   Patient presents with     Cough       Initial There were no vitals taken for this visit. Estimated body mass index is 24.09 kg/(m^2) as calculated from the following:    Height as of 1/29/11: 5' 3\" (1.6 m).    Weight as of 4/9/17: 136 lb (61.7 kg).  Medication Reconciliation: complete  "

## 2017-11-15 NOTE — PROGRESS NOTES
SUBJECTIVE:   Julia Flynn is a 47 year old female who presents to clinic today for the following health issues:      Concern - cough    Onset: Oct 5    Description:   Productive cough, yellow, green    Intensity: mild    Progression of Symptoms:  same    Accompanying Signs & Symptoms:  none    Previous history of similar problem:   Seen 11/2/17 was on Zpak, helped for a short time. Sx returning    Precipitating factors:   Worsened by: none    Alleviating factors:  Improved by: water, walking more, cough drops    Therapies Tried and outcome: see above      The patient is seen in clinic today with a persistent spasmodic cough. It will seem to be fine for a while, then recurs area and it has been interfering with sleep. Initially it the cough is productive of colored mucus, she was treated with Z-Chris. The mucus has cleared, but she continues to have this persistent cough. She gets a bit of a headache when she is coughing, otherwise states he does not feel ill. She is not running a fever. Denies chest congestion, chest pain, shortness of breath, or wheezing. She has no history of asthma, she is a nonsmoker    Problem list and histories reviewed & adjusted, as indicated.  Additional history: as documented    BP Readings from Last 3 Encounters:   11/15/17 120/60   11/02/17 106/72   10/05/17 111/76    Wt Readings from Last 3 Encounters:   11/15/17 133 lb 14.4 oz (60.7 kg)   04/09/17 136 lb (61.7 kg)   12/23/16 132 lb (59.9 kg)                      Reviewed and updated as needed this visit by clinical staff     Reviewed and updated as needed this visit by Provider         ROS:  Constitutional, HEENT, cardiovascular, pulmonary, gi and gu systems are negative, except as otherwise noted.      OBJECTIVE:   /60  Pulse 80  Temp 97.7  F (36.5  C) (Tympanic)  Wt 133 lb 14.4 oz (60.7 kg)  BMI 23.72 kg/m2  Body mass index is 23.72 kg/(m^2).   GENERAL: healthy, alert and no distress  EYES: Eyes grossly normal to  inspection, PERRL and conjunctivae and sclerae normal  HENT: ear canals and TM's normal, nose and mouth without ulcers or lesions  NECK: no adenopathy, no asymmetry, masses, or scars and thyroid normal to palpation  RESP: lungs clear to auscultation - no rales, rhonchi or wheezes  CV: regular rates and rhythm, normal S1 S2, no S3 or S4 and no murmur, click or rub        ASSESSMENT/PLAN:     Problem List Items Addressed This Visit     None      Visit Diagnoses     Acute bronchitis, unspecified organism    -  Primary    Relevant Medications    benzonatate (TESSALON) 200 MG capsule    albuterol (PROAIR HFA/PROVENTIL HFA/VENTOLIN HFA) 108 (90 BASE) MCG/ACT Inhaler                 No need for further antibiotics. Symptomatic measures including increased medication and oral fluids  Tessalon pearls one capsule 3 times daily as needed for cough  Albuterol inhaler 2 puffs every 4-6 hours as needed for wheezing, shortness of breath, or bronchospasm  She is scheduled for a well exam in 2 weeks, will recheck at that time, sooner if not improving    MARIANNE Izaguirre CNP  [facility]

## 2017-12-05 ENCOUNTER — OFFICE VISIT (OUTPATIENT)
Dept: FAMILY MEDICINE | Facility: CLINIC | Age: 47
End: 2017-12-05
Payer: COMMERCIAL

## 2017-12-05 ENCOUNTER — DOCUMENTATION ONLY (OUTPATIENT)
Dept: FAMILY MEDICINE | Facility: CLINIC | Age: 47
End: 2017-12-05

## 2017-12-05 VITALS
BODY MASS INDEX: 23.57 KG/M2 | SYSTOLIC BLOOD PRESSURE: 120 MMHG | HEIGHT: 63 IN | WEIGHT: 133 LBS | DIASTOLIC BLOOD PRESSURE: 66 MMHG | TEMPERATURE: 98.2 F | HEART RATE: 60 BPM

## 2017-12-05 DIAGNOSIS — Z12.31 ENCOUNTER FOR SCREENING MAMMOGRAM FOR BREAST CANCER: ICD-10-CM

## 2017-12-05 DIAGNOSIS — Z00.00 ROUTINE GENERAL MEDICAL EXAMINATION AT A HEALTH CARE FACILITY: Primary | ICD-10-CM

## 2017-12-05 PROCEDURE — 99396 PREV VISIT EST AGE 40-64: CPT | Performed by: NURSE PRACTITIONER

## 2017-12-05 PROCEDURE — 87624 HPV HI-RISK TYP POOLED RSLT: CPT | Performed by: NURSE PRACTITIONER

## 2017-12-05 PROCEDURE — G0145 SCR C/V CYTO,THINLAYER,RESCR: HCPCS | Performed by: NURSE PRACTITIONER

## 2017-12-05 ASSESSMENT — PATIENT HEALTH QUESTIONNAIRE - PHQ9
SUM OF ALL RESPONSES TO PHQ QUESTIONS 1-9: 0
SUM OF ALL RESPONSES TO PHQ QUESTIONS 1-9: 0

## 2017-12-05 NOTE — LETTER
[address]   [address]  Tel. [phone]/ Fax [phone]    March 2, 2018        Julia Flynn  [address] [place] [id number]          Dear MsElizabeth Dumont ESTUARDO Rina:    Your previous orders for glucose lab work have been extended.  Please call to schedule a lab appointment and return to the clinic to have the labs drawn at your earliest convenience.    If you are required to be fasting for these tests,  remember to have nothing by mouth (except water) after midnight on the night before your test.    If you have any questions or concerns, please contact our office.    Sincerely,       Lizette Jeffers, NP care team

## 2017-12-05 NOTE — NURSING NOTE
"Chief Complaint   Patient presents with     Physical       Initial There were no vitals taken for this visit. Estimated body mass index is 23.72 kg/(m^2) as calculated from the following:    Height as of 1/29/11: 5' 3\" (1.6 m).    Weight as of 11/15/17: 133 lb 14.4 oz (60.7 kg).  Medication Reconciliation: complete  "

## 2017-12-05 NOTE — MR AVS SNAPSHOT
After Visit Summary   12/5/2017    Julia Flynn    MRN: [id number]           Patient Information     Date Of Birth          1970        Visit Information        Provider Department      12/5/2017 4:45 PM Lizette Jeffers APRN [facility]        Today's Diagnoses     Routine general medical examination at a health care facility    -  1    Encounter for screening mammogram for breast cancer          Care Instructions      Preventive Health Recommendations  Female Ages 40 to 49    Yearly exam:     See your health care provider every year in order to  1. Review health changes.   2. Discuss preventive care.    3. Review your medicines if your doctor prescribed any.      Get a Pap test every three years (unless you have an abnormal result and your provider advises testing more often).      If you get Pap tests with HPV test, you only need to test every 5 years, unless you have an abnormal result. You do not need a Pap test if your uterus was removed (hysterectomy) and you have not had cancer.      You should be tested each year for STDs (sexually transmitted diseases), if you're at risk.       Ask your doctor if you should have a mammogram.      Have a colonoscopy (test for colon cancer) if someone in your family has had colon cancer or polyps before age 50.       Have a cholesterol test every 5 years.       Have a diabetes test (fasting glucose) after age 45. If you are at risk for diabetes, you should have this test every 3 years.    Shots: Get a flu shot each year. Get a tetanus shot every 10 years.     Nutrition:     Eat at least 5 servings of fruits and vegetables each day.    Eat whole-grain bread, whole-wheat pasta and brown rice instead of white grains and rice.    Talk to your provider about Calcium and Vitamin D.     Lifestyle    Exercise at least 150 minutes a week (an average of 30 minutes a day, 5 days a week). This will help you control your weight and prevent  "disease.    Limit alcohol to one drink per day.    No smoking.     Wear sunscreen to prevent skin cancer.    See your dentist every six months for an exam and cleaning.          Follow-ups after your visit        Future tests that were ordered for you today     Open Future Orders        Priority Expected Expires Ordered    *MA Screening Digital Bilateral Routine  12/5/2018 12/5/2017            Who to contact     If you have questions or need follow up information about today's clinic visit or your schedule please contact [facility] directly at [phone].  Normal or non-critical lab and imaging results will be communicated to you by [employer], letter or phone within 4 business days after the clinic has received the results. If you do not hear from us within 7 days, please contact the clinic through [employer] or phone. If you have a critical or abnormal lab result, we will notify you by phone as soon as possible.  Submit refill requests through [employer] or call your pharmacy and they will forward the refill request to us. Please allow 3 business days for your refill to be completed.          Additional Information About Your Visit        [email or web address] Information     [employer] gives you secure access to your electronic health record. If you see a primary care provider, you can also send messages to your care team and make appointments. If you have questions, please call your primary care clinic.  If you do not have a primary care provider, please call [phone] and they will assist you.        Care EveryWhere ID     This is your Care EveryWhere ID. This could be used by other organizations to access your [facility] medical records  [id number]        Your Vitals Were     Pulse Temperature Height BMI (Body Mass Index)          60 98.2  F (36.8  C) (Tympanic) 5' 3\" (1.6 m) 23.56 kg/m2         Blood Pressure from Last 3 Encounters:   12/05/17 120/66   11/15/17 120/60   11/02/17 106/72    Weight from Last 3 " Encounters:   12/05/17 133 lb (60.3 kg)   11/15/17 133 lb 14.4 oz (60.7 kg)   04/09/17 136 lb (61.7 kg)              We Performed the Following     GLUCOSE     HPV High Risk Types DNA Cervical     Pap imaged thin layer screen with HPV - recommended age 30 - 65 years (select HPV order below)        Primary Care Provider Office Phone # Fax #    Lizette Jeffers, APRN [facility] [phone]       [address] [place] [address]        Equal Access to Services     GEOFF AYALA : Hadii tiarra ku hadasho Soomaali, waaxda luqadaha, qaybta kaalmada adeegyada, waxdara ingram haybea handy . So [facility] [phone].    ATENCIÓN: Si habla español, tiene a andino disposición servicios gratuitos de asistencia lingüística. Llame al [phone].    We comply with applicable federal civil rights laws and [place] laws. We do not discriminate on the basis of race, color, national origin, age, disability, sex, sexual orientation, or gender identity.            Thank you!     Thank you for choosing [facility]  for your care. Our goal is always to provide you with excellent care. Hearing back from our patients is one way we can continue to improve our services. Please take a few minutes to complete the written survey that you may receive in the mail after your visit with us. Thank you!             Your Updated Medication List - Protect others around you: Learn how to safely use, store and throw away your medicines at www.disposemymeds.org.          This list is accurate as of: 12/5/17  5:25 PM.  Always use your most recent med list.                   Brand Name Dispense Instructions for use Diagnosis    calcium carbonate 1250 MG tablet    OS-STACEY 500 mg [place]. [place]     Take 1 tablet by mouth 2 times daily Reported on 2/13/2017        Multi-vitamin Tabs tablet   Generic drug:  multivitamin, therapeutic with minerals      Take 1 tablet by mouth daily Reported on 2/13/2017        VITAMIN B12 PO      Reported  on 2/13/2017        VITAMIN D PO      Reported on 2/13/2017

## 2017-12-05 NOTE — PROGRESS NOTES
Answers for HPI/ROS submitted by the patient on 12/5/2017   Annual Exam:  Getting at least 3 servings of Calcium per day:: Yes  Bi-annual eye exam:: Yes  Dental care twice a year:: Yes  Sleep apnea or symptoms of sleep apnea:: None  Diet:: Regular (no restrictions)  Frequency of exercise:: 6-7 days/week  Taking medications regularly:: Not Applicable  Medication side effects:: Not applicable  Additional concerns today:: No  PHQ-2 Score: 3  Duration of exercise:: 30-45 minutes  PHQ9 TOTAL SCORE: 0

## 2017-12-05 NOTE — PROGRESS NOTES
SUBJECTIVE:   CC: Julia Flynn is an 47 year old woman who presents for preventive health visit.     Physical   Annual:     Getting at least 3 servings of Calcium per day::  Yes    Bi-annual eye exam::  Yes    Dental care twice a year::  Yes    Sleep apnea or symptoms of sleep apnea::  None    Diet::  Regular (no restrictions)    Frequency of exercise::  6-7 days/week    Duration of exercise::  30-45 minutes    Taking medications regularly::  Not Applicable    Additional concerns today::  No              Today's PHQ-2 Score:   PHQ-2 ( 1999 [employer]) 12/5/2017   Q1: Little interest or pleasure in doing things 3   Q2: Feeling down, depressed or hopeless 0   PHQ-2 Score 3   Q1: Little interest or pleasure in doing things Nearly every day   Q2: Feeling down, depressed or hopeless Not at all   PHQ-2 Score 3       Abuse: Current or Past(Physical, Sexual or Emotional)- No  Do you feel safe in your environment - Yes    Social History   Substance Use Topics     Smoking status: Never Smoker     Smokeless tobacco: Never Used     Alcohol use No     The patient does not drink >3 drinks per day nor >7 drinks per week.    Reviewed orders with patient.  Reviewed health maintenance and updated orders accordingly - Yes  BP Readings from Last 3 Encounters:   12/05/17 120/66   11/15/17 120/60   11/02/17 106/72    Wt Readings from Last 3 Encounters:   12/05/17 133 lb (60.3 kg)   11/15/17 133 lb 14.4 oz (60.7 kg)   04/09/17 136 lb (61.7 kg)                      Patient under age 50, mutual decision reflected in health maintenance.        Pertinent mammograms are reviewed under the imaging tab.  History of abnormal Pap smear: NO - age 30-65 PAP every 5 years with negative HPV co-testing recommended    Reviewed and updated as needed this visit by clinical staff  Tobacco  Allergies  Meds  Med Hx  Surg Hx  Fam Hx  Soc Hx        Reviewed and updated as needed this visit by Provider        History reviewed. No pertinent past  "medical history.   History reviewed. No pertinent surgical history.  Obstetric History       T3      L3     SAB0   TAB0   Ectopic0   Multiple0   Live Births0       # Outcome Date GA Lbr Terrell/2nd Weight Sex Delivery Anes PTL Lv   3 Term            2 Term            1 Term               Review of Systems  C: NEGATIVE for fever, chills, change in weight  I: NEGATIVE for worrisome rashes, moles or lesions  E: NEGATIVE for vision changes or irritation  ENT: NEGATIVE for ear, mouth and throat problems  R: NEGATIVE for significant cough or SOB  B: NEGATIVE for masses, tenderness or discharge  CV: NEGATIVE for chest pain, palpitations or peripheral edema  GI: NEGATIVE for nausea, abdominal pain, heartburn, or change in bowel habits  : NEGATIVE for unusual urinary or vaginal symptoms. No vaginal bleeding.  States she is postmenopausal, cannot recall when her last menstrual period was  M: NEGATIVE for significant arthralgias or myalgia  N: NEGATIVE for weakness, dizziness or paresthesias  E: NEGATIVE for temperature intolerance, skin/hair changes  P: NEGATIVE for changes in mood or affect      OBJECTIVE:   /66  Pulse 60  Temp 98.2  F (36.8  C) (Tympanic)  Ht 5' 3\" (1.6 m)  Wt 133 lb (60.3 kg)  BMI 23.56 kg/m2  Physical Exam  GENERAL APPEARANCE: healthy, alert and no distress  EYES: Eyes grossly normal to inspection, PERRL and conjunctivae and sclerae normal  HENT: ear canals and TM's normal, nose and mouth without ulcers or lesions, oropharynx clear and oral mucous membranes moist  NECK: no adenopathy, no asymmetry, masses, or scars and thyroid normal to palpation  RESP: lungs clear to auscultation - no rales, rhonchi or wheezes  BREAST: normal without masses, tenderness or nipple discharge and no palpable axillary masses or adenopathy  CV: regular rate and rhythm, normal S1 S2, no S3 or S4, no murmur, click or rub, no peripheral edema and peripheral pulses strong  ABDOMEN: soft, nontender, no " "hepatosplenomegaly, no masses and bowel sounds normal   (female): normal female external genitalia, normal urethral meatus, vaginal mucosal atrophy noted, normal cervix, adnexae, and uterus without masses or abnormal discharge  MS: no musculoskeletal defects are noted and gait is age appropriate without ataxia  SKIN: no suspicious lesions or rashes  NEURO: Normal strength and tone, sensory exam grossly normal, mentation intact and speech normal  PSYCH: mentation appears normal and affect normal/bright    ASSESSMENT/PLAN:       ICD-10-CM    1. Routine general medical examination at a health care facility Z00.00 Pap imaged thin layer screen with HPV - recommended age 30 - 65 years (select HPV order below)     HPV High Risk Types DNA Cervical   2. Encounter for screening mammogram for breast cancer Z12.31 GLUCOSE     *MA Screening Digital Bilateral       COUNSELING:  Reviewed preventive health counseling, as reflected in patient instructions       Regular exercise       Healthy diet/nutrition       Osteoporosis Prevention/Bone Health         reports that she has never smoked. She has never used smokeless tobacco.    Estimated body mass index is 23.56 kg/(m^2) as calculated from the following:    Height as of this encounter: 5' 3\" (1.6 m).    Weight as of this encounter: 133 lb (60.3 kg).         Counseling Resources:  ATP IV Guidelines  Pooled Cohorts Equation Calculator  Breast Cancer Risk Calculator  FRAX Risk Assessment  ICSI Preventive Guidelines  Dietary Guidelines for Americans, 2010  USDA's MyPlate  ASA Prophylaxis  Lung CA Screening    MARIANNE Izaguirre [facility]  "

## 2017-12-06 ASSESSMENT — PATIENT HEALTH QUESTIONNAIRE - PHQ9: SUM OF ALL RESPONSES TO PHQ QUESTIONS 1-9: 0

## 2017-12-06 NOTE — PROGRESS NOTES
This patient did not show up for the labs that were ordered today.  I have canceled and reordered as future for one week.  Please contact the patient to come back in.  Thank you! -Mariam THOMAS, Lab

## 2017-12-07 LAB
COPATH REPORT: NORMAL
PAP: NORMAL

## 2017-12-11 LAB
FINAL DIAGNOSIS: NORMAL
HPV HR 12 DNA CVX QL NAA+PROBE: NEGATIVE
HPV16 DNA SPEC QL NAA+PROBE: NEGATIVE
HPV18 DNA SPEC QL NAA+PROBE: NEGATIVE
SPECIMEN DESCRIPTION: NORMAL

## 2017-12-27 ENCOUNTER — HOSPITAL ENCOUNTER (OUTPATIENT)
Dept: MAMMOGRAPHY | Facility: CLINIC | Age: 47
Discharge: HOME | End: 2017-12-27
Attending: NURSE PRACTITIONER | Admitting: NURSE PRACTITIONER
Payer: COMMERCIAL

## 2017-12-27 DIAGNOSIS — Z12.31 VISIT FOR SCREENING MAMMOGRAM: ICD-10-CM

## 2017-12-27 PROCEDURE — G0202 SCR MAMMO BI INCL CAD: HCPCS

## 2018-02-19 ENCOUNTER — OFFICE VISIT (OUTPATIENT)
Dept: URGENT CARE | Facility: RETAIL CLINIC | Age: 48
End: 2018-02-19
Payer: COMMERCIAL

## 2018-02-19 VITALS
SYSTOLIC BLOOD PRESSURE: 127 MMHG | HEART RATE: 80 BPM | OXYGEN SATURATION: 97 % | TEMPERATURE: 98.1 F | DIASTOLIC BLOOD PRESSURE: 64 MMHG

## 2018-02-19 DIAGNOSIS — J02.9 ACUTE PHARYNGITIS, UNSPECIFIED ETIOLOGY: Primary | ICD-10-CM

## 2018-02-19 DIAGNOSIS — J02.0 STREP THROAT: ICD-10-CM

## 2018-02-19 DIAGNOSIS — H92.03 OTALGIA OF BOTH EARS: ICD-10-CM

## 2018-02-19 LAB — S PYO AG THROAT QL IA.RAPID: ABNORMAL

## 2018-02-19 PROCEDURE — 87880 STREP A ASSAY W/OPTIC: CPT | Mod: QW | Performed by: NURSE PRACTITIONER

## 2018-02-19 PROCEDURE — 99213 OFFICE O/P EST LOW 20 MIN: CPT | Performed by: NURSE PRACTITIONER

## 2018-02-19 RX ORDER — PENICILLIN V POTASSIUM 500 MG/1
500 TABLET, FILM COATED ORAL 3 TIMES DAILY
Qty: 30 TABLET | Refills: 0 | Status: SHIPPED | OUTPATIENT
Start: 2018-02-19 | End: 2018-03-01

## 2018-02-19 NOTE — PROGRESS NOTES
[facility] clinic note    SUBJECTIVE:  Julia Flynn is a 48 year old female who presents to [facility] with chief complaint of sore throat.    Onset of symptoms was 1-2 week(s) ago.    Course of illness: gradual onset.    Severity moderate  Course of illness:  Current and Associated symptoms: stuffy nose, cough - non-productive, ear pain both, sore throat, hoarse voice, facial pain/pressure and headache  Treatment measures tried at home include OTC Cough med and Fluids.  Predisposing factors include ill contact: School and Work.    Current Outpatient Prescriptions   Medication     Cyanocobalamin (VITAMIN B12 PO)     Cholecalciferol (VITAMIN D PO)     calcium carbonate 500 MG tablet     Multiple Vitamin (MULTI-VITAMIN) per tablet     No current facility-administered medications for this visit.      PAST MEDICAL HISTORY: No past medical history on file.    PAST SURGICAL HISTORY: No past surgical history on file.    FAMILY HISTORY: No family history on file.    SOCIAL HISTORY:   Social History   Substance Use Topics     Smoking status: Never Smoker     Smokeless tobacco: Never Used     Alcohol use No       ROS:  Review of systems negative except as stated above.    OBJECTIVE:   Vitals:    02/19/18 1213   BP: 127/64   Pulse: 80   Temp: 98.1  F (36.7  C)   TempSrc: Oral   SpO2: 97%     GENERAL APPEARANCE: alert, active, moderate distress and cooperative  EYES: EOMI,  PERRL, conjunctiva clear  HENT: ear canals and TM's normal.  Nose normal.  Pharynx slightly erythematous noted.  NECK: supple, non-tender to palpation, no adenopathy noted  RESP: lungs clear to auscultation - no rales, rhonchi or wheezes  CV: regular rates and rhythm, normal S1 S2, no murmur noted  ABDOMEN:  soft, nontender, no HSM or masses and bowel sounds normal  SKIN: no suspicious lesions or rashes    Rapid Strep test is positive    ASSESSMENT:   Acute pharyngitis, unspecified etiology  Otalgia of  both ears  Strep throat      PLAN:   Outpatient Encounter Prescriptions as of 2/19/2018   Medication Sig Dispense Refill     penicillin V potassium (VEETID) 500 MG tablet Take 1 tablet (500 mg) by mouth 3 times daily for 10 days 30 tablet 0     Cyanocobalamin (VITAMIN B12 PO) Reported on 2/13/2017       Cholecalciferol (VITAMIN D PO) Reported on 2/13/2017       calcium carbonate 500 MG tablet Take 1 tablet by mouth 2 times daily Reported on 2/13/2017       Multiple Vitamin (MULTI-VITAMIN) per tablet Take 1 tablet by mouth daily Reported on 2/13/2017       No facility-administered encounter medications on file as of 2/19/2018.      If not improving Follow up at:  [facility] [place] [phone]  Encourage good hydration (mainly water), may drink tea /c honey, warm chicken broth to sooth throat.  Soft foods may be preferred for several days.  Symptomatic treatment with warm Na+ H2O gargles, and OTC meds as needed.   Will be contagious for 24 hours after starting antibiotic & should stay out of public settings.  The goal to minimize exposure to other people.  When given antibiotics follow the full treatment your health care provider recommends. (Finish medications even if feeling better).  Toothbrush should be replaced after 24 hours of being on antibiotic.  Also, wash anything that your mouth has been in contact with recently (water & coffee cups, etc.)    Rest as needed.  Follow-up with primary care provider if not improving or continues to have temps, greater than 48 hours after starting antibiotics.    If difficulty breathing or swallowing be seen in the ED immediately.    Easton Stubbs MSN, APRN, Family NP-C  [facility]

## 2018-02-19 NOTE — NURSING NOTE
"Chief Complaint   Patient presents with     Pharyngitis     x a week or two off and on     Sinus Problem     Headache       Initial /64  Pulse 80  Temp 98.1  F (36.7  C) (Oral)  SpO2 97% Estimated body mass index is 23.56 kg/(m^2) as calculated from the following:    Height as of 12/5/17: 5' 3\" (1.6 m).    Weight as of 12/5/17: 133 lb (60.3 kg).  Medication Reconciliation: complete   Stephanie Saenz      "

## 2018-02-19 NOTE — MR AVS SNAPSHOT
After Visit Summary   2/19/2018    Julia Flynn    MRN: [id number]           Patient Information     Date Of Birth          1970        Visit Information        Provider Department      2/19/2018 12:10 PM Easton Stubbs APRN [facility]        Today's Diagnoses     Acute pharyngitis, unspecified etiology    -  1    Otalgia of both ears        Strep throat           Follow-ups after your visit        Who to contact     You can reach your care team any time of the day by calling [phone].  Notification of test results:  If you have an abnormal lab result, we will notify you by phone as soon as possible.         Additional Information About Your Visit        MyChart Information     [employer] gives you secure access to your electronic health record. If you see a primary care provider, you can also send messages to your care team and make appointments. If you have questions, please call your primary care clinic.  If you do not have a primary care provider, please call [phone] and they will assist you.        Care EveryWhere ID     This is your Care EveryWhere ID. This could be used by other organizations to access your [place] medical records  [id number]        Your Vitals Were     Pulse Temperature Pulse Oximetry             80 98.1  F (36.7  C) (Oral) 97%          Blood Pressure from Last 3 Encounters:   02/19/18 127/64   12/05/17 120/66   11/15/17 120/60    Weight from Last 3 Encounters:   12/05/17 133 lb (60.3 kg)   11/15/17 133 lb 14.4 oz (60.7 kg)   04/09/17 136 lb (61.7 kg)              We Performed the Following     BETA STREP GROUP A R/O CULTURE     RAPID STREP SCREEN          Today's Medication Changes          These changes are accurate as of 2/19/18 12:29 PM.  If you have any questions, ask your nurse or doctor.               Start taking these medicines.        Dose/Directions    penicillin V potassium 500 MG tablet   Commonly known as:  VEETID    Used for:  Strep throat   Started by:  Easton Stubbs, APRN CNP        Dose:  500 mg   Take 1 tablet (500 mg) by mouth 3 times daily for 10 days   Quantity:  30 tablet   Refills:  0            Where to get your medicines      These medications were sent to [facility] - [place], [place] - [address]  [address], [place] [address]     Phone:  [phone]     penicillin V potassium 500 MG tablet                Primary Care Provider Office Phone # Fax #    Lizette MARIANNE Patñio -[phone]       [address] [place] [address]        Equal Access to Services     [facility]: Hadii aad ku hadasho Soomaali, waaxda luqadaha, qaybta kaalmada adeegyada, magaly handy . So [facility] [phone].    ATENCIÓN: Si habla español, tiene a andino disposición servicios gratuitos de asistencia lingüística. Llame al [phone].    We comply with applicable federal civil rights laws and [place] laws. We do not discriminate on the basis of race, color, national origin, age, disability, sex, sexual orientation, or gender identity.            Thank you!     Thank you for choosing [facility]  for your care. Our goal is always to provide you with excellent care. Hearing back from our patients is one way we can continue to improve our services. Please take a few minutes to complete the written survey that you may receive in the mail after your visit with us. Thank you!             Your Updated Medication List - Protect others around you: Learn how to safely use, store and throw away your medicines at www.disposemymeds.org.          This list is accurate as of 2/19/18 12:29 PM.  Always use your most recent med list.                   Brand Name Dispense Instructions for use Diagnosis    calcium carbonate 1250 MG tablet    OS-STACEY 500 mg [place]. [place]     Take 1 tablet by mouth 2 times daily Reported on 2/13/2017        Multi-vitamin Tabs tablet   Generic drug:   multivitamin, therapeutic with minerals      Take 1 tablet by mouth daily Reported on 2/13/2017        penicillin V potassium 500 MG tablet    VEETID    30 tablet    Take 1 tablet (500 mg) by mouth 3 times daily for 10 days    Strep throat       VITAMIN B12 PO      Reported on 2/13/2017        VITAMIN D PO      Reported on 2/13/2017

## 2018-07-27 ENCOUNTER — APPOINTMENT (OUTPATIENT)
Dept: LAB | Facility: CLINIC | Age: 48
End: 2018-07-27
Payer: COMMERCIAL

## 2018-07-27 DIAGNOSIS — Z13.6 CARDIOVASCULAR SCREENING; LDL GOAL LESS THAN 130: Primary | ICD-10-CM

## 2018-07-27 DIAGNOSIS — Z13.1 SCREENING FOR DIABETES MELLITUS: ICD-10-CM

## 2018-07-27 LAB
CHOLEST SERPL-MCNC: 222 MG/DL
GLUCOSE SERPL-MCNC: 88 MG/DL (ref 70–99)
HDLC SERPL-MCNC: 84 MG/DL
LDLC SERPL CALC-MCNC: 123 MG/DL
NONHDLC SERPL-MCNC: 138 MG/DL
TRIGL SERPL-MCNC: 75 MG/DL

## 2018-07-27 PROCEDURE — 80061 LIPID PANEL: CPT | Performed by: NURSE PRACTITIONER

## 2018-07-27 PROCEDURE — 36415 COLL VENOUS BLD VENIPUNCTURE: CPT | Performed by: NURSE PRACTITIONER

## 2018-07-27 PROCEDURE — 82947 ASSAY GLUCOSE BLOOD QUANT: CPT | Performed by: NURSE PRACTITIONER

## 2018-08-27 ENCOUNTER — OFFICE VISIT (OUTPATIENT)
Dept: FAMILY MEDICINE | Facility: OTHER | Age: 48
End: 2018-08-27
Payer: COMMERCIAL

## 2018-08-27 VITALS
HEART RATE: 80 BPM | SYSTOLIC BLOOD PRESSURE: 106 MMHG | WEIGHT: 127.9 LBS | HEIGHT: 63 IN | RESPIRATION RATE: 16 BRPM | DIASTOLIC BLOOD PRESSURE: 70 MMHG | TEMPERATURE: 98.2 F | BODY MASS INDEX: 22.66 KG/M2

## 2018-08-27 DIAGNOSIS — L03.114 CELLULITIS OF ARM, LEFT: Primary | ICD-10-CM

## 2018-08-27 PROCEDURE — 99213 OFFICE O/P EST LOW 20 MIN: CPT | Performed by: PHYSICIAN ASSISTANT

## 2018-08-27 RX ORDER — SULFAMETHOXAZOLE/TRIMETHOPRIM 800-160 MG
1 TABLET ORAL 2 TIMES DAILY
Qty: 20 TABLET | Refills: 0 | Status: SHIPPED | OUTPATIENT
Start: 2018-08-27 | End: 2019-03-06

## 2018-08-27 ASSESSMENT — PAIN SCALES - GENERAL: PAINLEVEL: MILD PAIN (3)

## 2018-08-27 NOTE — MR AVS SNAPSHOT
"              After Visit Summary   8/27/2018    Julia Flynn    MRN: [id number]           Patient Information     Date Of Birth          1970        Visit Information        Provider Department      8/27/2018 1:20 PM Jason Amor PA-C [facility]        Today's Diagnoses     Cellulitis of arm, left    -  1      Care Instructions    Bacitracin            Follow-ups after your visit        Who to contact     If you have questions or need follow up information about today's clinic visit or your schedule please contact [facility] directly at [phone].  Normal or non-critical lab and imaging results will be communicated to you by Insightful[employer]art, letter or phone within 4 business days after the clinic has received the results. If you do not hear from us within 7 days, please contact the clinic through [employer] or phone. If you have a critical or abnormal lab result, we will notify you by phone as soon as possible.  Submit refill requests through [employer] or call your pharmacy and they will forward the refill request to us. Please allow 3 business days for your refill to be completed.          Additional Information About Your Visit        MyChart Information     [employer] gives you secure access to your electronic health record. If you see a primary care provider, you can also send messages to your care team and make appointments. If you have questions, please call your primary care clinic.  If you do not have a primary care provider, please call [phone] and they will assist you.        Care EveryWhere ID     This is your Care EveryWhere ID. This could be used by other organizations to access your [place] medical records  [id number]        Your Vitals Were     Pulse Temperature Respirations Height BMI (Body Mass Index)       80 98.2  F (36.8  C) (Temporal) 16 5' 3\" (1.6 m) 22.66 kg/m2        Blood Pressure from Last 3 Encounters:   08/27/18 106/70   02/19/18 127/64   12/05/17 " 120/66    Weight from Last 3 Encounters:   08/27/18 127 lb 14.4 oz (58 kg)   12/05/17 133 lb (60.3 kg)   11/15/17 133 lb 14.4 oz (60.7 kg)              Today, you had the following     No orders found for display         Today's Medication Changes          These changes are accurate as of 8/27/18  1:50 PM.  If you have any questions, ask your nurse or doctor.               Start taking these medicines.        Dose/Directions    sulfamethoxazole-trimethoprim 800-160 MG per tablet   Commonly known as:  BACTRIM DS/SEPTRA DS   Used for:  Cellulitis of arm, left   Started by:  Jason Amor PA-C        Dose:  1 tablet   Take 1 tablet by mouth 2 times daily   Quantity:  20 tablet   Refills:  0            Where to get your medicines      These medications were sent to [address]  [address], [facility] [address]     Phone:  [phone]     sulfamethoxazole-trimethoprim 800-160 MG per tablet                Primary Care Provider Office Phone # Fax #    Lizette Jeffers, MARIANNE [facility] [phone] [address]   [facility] [address]        Equal Access to Services     BRY AYALA AH: Hadii tiarra guidry hadasho Sopetraali, waaxda luqadaha, qaybta kaalmada adeegyada, magaly blackwell. So [facility] [phone].    ATENCIÓN: Si habla español, tiene a andino disposición servicios gratuitos de asistencia lingüística. [facility] [phone].    We comply with applicable federal civil rights laws and [place] laws. We do not discriminate on the basis of race, color, national origin, age, disability, sex, sexual orientation, or gender identity.            Thank you!     Thank you for choosing [facility]  for your care. Our goal is always to provide you with excellent care. Hearing back from our patients is one way we can continue to improve our services. Please take a few minutes to complete the written survey that you may receive in the mail after your  visit with us. Thank you!             Your Updated Medication List - Protect others around you: Learn how to safely use, store and throw away your medicines at www.disposemymeds.org.          This list is accurate as of 8/27/18  1:50 PM.  Always use your most recent med list.                   Brand Name Dispense Instructions for use Diagnosis    calcium carbonate 500 mg {elemental} 500 MG tablet    OS-STACEY     Take 1 tablet by mouth 2 times daily Reported on 2/13/2017        Multi-vitamin Tabs tablet   Generic drug:  multivitamin, therapeutic with minerals      Take 1 tablet by mouth daily Reported on 2/13/2017        sulfamethoxazole-trimethoprim 800-160 MG per tablet    BACTRIM DS/SEPTRA DS    20 tablet    Take 1 tablet by mouth 2 times daily    Cellulitis of arm, left       VITAMIN B12 PO      Reported on 2/13/2017        VITAMIN D PO      Reported on 2/13/2017

## 2018-08-27 NOTE — PROGRESS NOTES
SUBJECTIVE:   Julia Flynn is a 48 year old female who presents to clinic today for the following health issues:      HPI  Concern - Puncture   Onset: 1 week    Description:   Patient has puncture wound on right arm from a tarah bush.     Intensity: moderate    Progression of Symptoms:  same    Accompanying Signs & Symptoms:  Pain, swelling, pressure, red    Previous history of similar problem:   None    Precipitating factors:   Worsened by: Pressure to the area    Alleviating factors:  Improved by: bandage    Therapies Tried and outcome: antibacterial with bandage, heat,   Problem list and histories reviewed & adjusted, as indicated.  Additional history:     Patient Active Problem List   Diagnosis     Seasonal allergic rhinitis     CARDIOVASCULAR SCREENING; LDL GOAL LESS THAN 130     Screening for diabetes mellitus     History reviewed. No pertinent surgical history.    Social History   Substance Use Topics     Smoking status: Never Smoker     Smokeless tobacco: Never Used     Alcohol use No     History reviewed. No pertinent family history.      Current Outpatient Prescriptions   Medication Sig Dispense Refill     calcium carbonate 500 MG tablet Take 1 tablet by mouth 2 times daily Reported on 2/13/2017       Cholecalciferol (VITAMIN D PO) Reported on 2/13/2017       Cyanocobalamin (VITAMIN B12 PO) Reported on 2/13/2017       Multiple Vitamin (MULTI-VITAMIN) per tablet Take 1 tablet by mouth daily Reported on 2/13/2017       sulfamethoxazole-trimethoprim (BACTRIM DS/SEPTRA DS) 800-160 MG per tablet Take 1 tablet by mouth 2 times daily 20 tablet 0     Allergies   Allergen Reactions     Cefprozil Rash     BP Readings from Last 3 Encounters:   08/27/18 106/70   02/19/18 127/64   12/05/17 120/66    Wt Readings from Last 3 Encounters:   08/27/18 127 lb 14.4 oz (58 kg)   12/05/17 133 lb (60.3 kg)   11/15/17 133 lb 14.4 oz (60.7 kg)                    ROS:  Constitutional, HEENT, cardiovascular, pulmonary, gi and  "gu systems are negative, except as otherwise noted.    OBJECTIVE:     /70 (Cuff Size: Adult Regular)  Pulse 80  Temp 98.2  F (36.8  C) (Temporal)  Resp 16  Ht 5' 3\" (1.6 m)  Wt 127 lb 14.4 oz (58 kg)  BMI 22.66 kg/m2  Body mass index is 22.66 kg/(m^2).  GENERAL: healthy, alert and no distress  RESP: lungs clear to auscultation - no rales, rhonchi or wheezes  CV: regular rate and rhythm, normal S1 S2, no S3 or S4, no murmur, click or rub, no peripheral edema and peripheral pulses strong  MS: no gross musculoskeletal defects noted, no edema  SKIN: no suspicious lesions or rashes with exception of a area of mild cellulitis surrounding a puncture site to the left anterior medial arm approximately 4 cm proximal to the wrist.  She has been trying to manipulate this and some erythema more than just infection is noted today.  We discussed the major concern around rosebushes and there are thorns in the form of sporotrichosis and advised at this point in time it does not appear that this is the problem she is dealing with.  This appears to be more cellulitic in nature.  NEURO: Normal strength and tone, mentation intact and speech normal  PSYCH: mentation appears normal, affect normal/bright    Diagnostic Test Results:  No results found for this or any previous visit (from the past 24 hour(s)).    ASSESSMENT/PLAN:     1. Cellulitis of arm, left  - sulfamethoxazole-trimethoprim (BACTRIM DS/SEPTRA DS) 800-160 MG per tablet; Take 1 tablet by mouth 2 times daily  Dispense: 20 tablet; Refill: 0    Follow-up if this does not improve.  She may try heat to this area to try to bring this to have this all possible.    Jason Castillo PA-C  [facility]"

## 2018-10-17 ENCOUNTER — ALLIED HEALTH/NURSE VISIT (OUTPATIENT)
Dept: FAMILY MEDICINE | Facility: OTHER | Age: 48
End: 2018-10-17
Payer: COMMERCIAL

## 2018-10-17 DIAGNOSIS — Z23 NEED FOR PROPHYLACTIC VACCINATION AND INOCULATION AGAINST INFLUENZA: Primary | ICD-10-CM

## 2018-10-17 PROCEDURE — 99207 ZZC NO CHARGE NURSE ONLY: CPT

## 2018-10-17 PROCEDURE — 90686 IIV4 VACC NO PRSV 0.5 ML IM: CPT

## 2018-10-17 PROCEDURE — 90471 IMMUNIZATION ADMIN: CPT

## 2018-10-17 NOTE — PROGRESS NOTES
Prior to injection verified patient identity using patient's name and date of birth.  Due to injection administration, patient instructed to remain in clinic for 15 minutes  afterwards, and to report any adverse reaction to me immediately.    Injectable Influenza Immunization Documentation    1.  Is the person to be vaccinated sick today?   No    2. Does the person to be vaccinated have an allergy to a component   of the vaccine?   No  Egg Allergy Algorithm Link    3. Has the person to be vaccinated ever had a serious reaction   to influenza vaccine in the past?   No    4. Has the person to be vaccinated ever had Guillain-Barré syndrome?   No    Form completed by Karina White CMA (AAMA)

## 2018-10-17 NOTE — MR AVS SNAPSHOT
After Visit Summary   10/17/2018    Julia Flynn    MRN: [id number]           Patient Information     Date Of Birth          1970        Visit Information        Provider Department      10/17/2018 11:00 AM NL FLU SHOT [facility]        Today's Diagnoses     Need for prophylactic vaccination and inoculation against influenza    -  1       Follow-ups after your visit        Your next 10 appointments already scheduled     Oct 17, 2018 11:00 AM CDT   Nurse Only with NL FLU SHOT [facility] ([facility])    [address] [address] [phone]              Who to contact     If you have questions or need follow up information about today's clinic visit or your schedule please contact [facility] directly at [phone].  Normal or non-critical lab and imaging results will be communicated to you by [employer] In[employer], letter or phone within 4 business days after the clinic has received the results. If you do not hear from us within 7 days, please contact the clinic through [employer] In[employer] or phone. If you have a critical or abnormal lab result, we will notify you by phone as soon as possible.  Submit refill requests through [employer] or call your pharmacy and they will forward the refill request to us. Please allow 3 business days for your refill to be completed.          Additional Information About Your Visit        MyChart Information     [employer] gives you secure access to your electronic health record. If you see a primary care provider, you can also send messages to your care team and make appointments. If you have questions, please call your primary care clinic.  If you do not have a primary care provider, please call [phone] and they will assist you.        Care EveryWhere ID     This is your Care EveryWhere ID. This could be used by other organizations to access your [place] medical records  [id number]          Blood Pressure from Last 3 Encounters:   08/27/18 106/70   02/19/18 127/64   12/05/17 120/66    Weight from Last 3 Encounters:   08/27/18 127 lb 14.4 oz (58 kg)   12/05/17 133 lb (60.3 kg)   11/15/17 133 lb 14.4 oz (60.7 kg)              We Performed the Following     FLU VACCINE, SPLIT VIRUS, IM (QUADRIVALENT) [11077]- >3 YRS     Vaccine Administration, Initial [22307]        Primary Care Provider Office Phone # Fax #    Lizette Jeffers, APRN [facility] [phone] [address] [place] [address]        Equal Access to Services     GEOFF AYALA : Trudy Hdez, waterence rouse, ivy kaalmavivian patel, magaly blackwell. So [facility] [phone].    ATENCIÓN: Si habla español, tiene a andino disposición servicios gratuitos de asistencia lingüística. Llame al [phone].    We comply with applicable federal civil rights laws and [place] laws. We do not discriminate on the basis of race, color, national origin, age, disability, sex, sexual orientation, or gender identity.            Thank you!     Thank you for choosing [facility]  for your care. Our goal is always to provide you with excellent care. Hearing back from our patients is one way we can continue to improve our services. Please take a few minutes to complete the written survey that you may receive in the mail after your visit with us. Thank you!             Your Updated Medication List - Protect others around you: Learn how to safely use, store and throw away your medicines at www.disposemymeds.org.          This list is accurate as of 10/17/18 10:41 AM.  Always use your most recent med list.                   Brand Name Dispense Instructions for use Diagnosis    calcium carbonate 500 mg (elemental) 500 MG tablet    OS-STACEY     Take 1 tablet by mouth 2 times daily Reported on 2/13/2017        Multi-vitamin Tabs tablet   Generic drug:  multivitamin, therapeutic with minerals       Take 1 tablet by mouth daily Reported on 2/13/2017        sulfamethoxazole-trimethoprim 800-160 MG per tablet    BACTRIM DS/SEPTRA DS    20 tablet    Take 1 tablet by mouth 2 times daily    Cellulitis of arm, left       VITAMIN B12 PO      Reported on 2/13/2017        VITAMIN D PO      Reported on 2/13/2017

## 2019-01-21 DIAGNOSIS — J20.9 ACUTE BRONCHITIS, UNSPECIFIED ORGANISM: ICD-10-CM

## 2019-01-21 NOTE — TELEPHONE ENCOUNTER
Reason for Call:  Medication or medication refill:    Do you use a [facility]?  Name of the pharmacy and phone number for the current request:  [facility] - [phone]    Name of the medication requested: Albuterol    Other request: Pt states she has a uri and has a cough and you prescribed she thought last Spring sometime, she's wondering if you can prescribe it now without seeing her? Please call and advise.     Can we leave a detailed message on this number? YES    Phone number patient can be reached at: Other phone number:      Best Time: anytime    Call taken on 1/21/2019 at 8:30 AM by Giuliana Dahl

## 2019-01-21 NOTE — TELEPHONE ENCOUNTER
She is has a URI again and is wondering if you can fill this for her like you did in 2017.  She is seeing you in Feb for a physical.

## 2019-01-22 RX ORDER — ALBUTEROL SULFATE 90 UG/1
2 AEROSOL, METERED RESPIRATORY (INHALATION) EVERY 6 HOURS PRN
Qty: 1 INHALER | Refills: 0 | Status: SHIPPED | OUTPATIENT
Start: 2019-01-22

## 2019-03-06 ENCOUNTER — OFFICE VISIT (OUTPATIENT)
Dept: FAMILY MEDICINE | Facility: CLINIC | Age: 49
End: 2019-03-06
Payer: COMMERCIAL

## 2019-03-06 DIAGNOSIS — Z00.00 WELL ADULT EXAM: Primary | ICD-10-CM

## 2019-03-06 PROCEDURE — 99396 PREV VISIT EST AGE 40-64: CPT | Performed by: NURSE PRACTITIONER

## 2019-03-06 ASSESSMENT — ENCOUNTER SYMPTOMS
PARESTHESIAS: 0
HEADACHES: 0
NERVOUS/ANXIOUS: 0
PALPITATIONS: 0
SORE THROAT: 0
CHILLS: 0
MYALGIAS: 0
EYE PAIN: 0
SHORTNESS OF BREATH: 0
JOINT SWELLING: 0
NAUSEA: 0
DIARRHEA: 0
DYSURIA: 0
DIZZINESS: 0
HEARTBURN: 0
COUGH: 0
FREQUENCY: 0
FEVER: 0
ARTHRALGIAS: 0
HEMATOCHEZIA: 0
HEMATURIA: 0
ABDOMINAL PAIN: 0
WEAKNESS: 0
BREAST MASS: 0
CONSTIPATION: 0

## 2019-03-06 ASSESSMENT — MIFFLIN-ST. JEOR: SCORE: 1181.54

## 2019-03-06 NOTE — PROGRESS NOTES
SUBJECTIVE:   CC: Julia Flynn is an 49 year old woman who presents for preventive health visit.     Physical   Annual:     Getting at least 3 servings of Calcium per day:  Yes    Bi-annual eye exam:  Yes    Dental care twice a year:  Yes    Sleep apnea or symptoms of sleep apnea:  None    Diet:  Regular (no restrictions)    Frequency of exercise:  6-7 days/week    Duration of exercise:  30-45 minutes    Taking medications regularly:  Not Applicable    Additional concerns today:  No    PHQ-2 Total Score: 0              Today's PHQ-2 Score:   PHQ-2 ( 1999 [employer]) 3/6/2019   Q1: Little interest or pleasure in doing things 0   Q2: Feeling down, depressed or hopeless 0   PHQ-2 Score 0   Q1: Little interest or pleasure in doing things Not at all   Q2: Feeling down, depressed or hopeless Not at all   PHQ-2 Score 0       Abuse: Current or Past(Physical, Sexual or Emotional)- No  Do you feel safe in your environment? Yes    Social History     Tobacco Use     Smoking status: Never Smoker     Smokeless tobacco: Never Used   Substance Use Topics     Alcohol use: No     Alcohol Use 3/6/2019   If you drink alcohol do you typically have greater than 3 drinks per day OR greater than 7 drinks per week? No       Reviewed orders with patient.  Reviewed health maintenance and updated orders accordingly - Yes  BP Readings from Last 3 Encounters:   03/06/19 (P) 104/68   08/27/18 106/70   02/19/18 127/64    Wt Readings from Last 3 Encounters:   03/06/19 (P) 58.7 kg (129 lb 8 oz)   08/27/18 58 kg (127 lb 14.4 oz)   12/05/17 60.3 kg (133 lb)                    Mammogram Screening: Patient under age 50, mutual decision reflected in health maintenance.      Pertinent mammograms are reviewed under the imaging tab.  History of abnormal Pap smear: NO - age 30-65 PAP every 5 years with negative HPV co-testing recommended  PAP / HPV Latest Ref Rng & Units 12/5/2017   PAP - NIL   HPV 16 DNA NEG:Negative Negative   HPV 18 DNA NEG:Negative  "Negative   OTHER HR HPV NEG:Negative Negative     Reviewed and updated as needed this visit by clinical staff  Tobacco  Allergies  Meds  Med Hx  Surg Hx  Fam Hx  Soc Hx        Reviewed and updated as needed this visit by Provider        History reviewed. No pertinent past medical history.   History reviewed. No pertinent surgical history.  Obstetric History       T3      L3     SAB0   TAB0   Ectopic0   Multiple0   Live Births0       # Outcome Date GA Lbr Terrell/2nd Weight Sex Delivery Anes PTL Lv   3 Term            2 Term            1 Term                   Review of Systems   Constitutional: Negative for chills and fever.   HENT: Negative for congestion, ear pain, hearing loss and sore throat.    Eyes: Negative for pain and visual disturbance.   Respiratory: Negative for cough and shortness of breath.    Cardiovascular: Negative for chest pain, palpitations and peripheral edema.   Gastrointestinal: Negative for abdominal pain, constipation, diarrhea, heartburn, hematochezia and nausea.   Breasts:  Negative for tenderness, breast mass and discharge.   Genitourinary: Negative for dysuria, frequency, genital sores, hematuria, pelvic pain, urgency, vaginal bleeding and vaginal discharge.   Musculoskeletal: Negative for arthralgias, joint swelling and myalgias.   Skin: Negative for rash.   Neurological: Negative for dizziness, weakness, headaches and paresthesias.   Psychiatric/Behavioral: Negative for mood changes. The patient is not nervous/anxious.           OBJECTIVE:   BP (P) 104/68   Pulse (P) 110   Temp (P) 97.4  F (36.3  C) (Temporal)   Resp (P) 22   Ht (P) 1.6 m (5' 3\")   Wt (P) 58.7 kg (129 lb 8 oz)   SpO2 (P) 99%   BMI (P) 22.94 kg/m    Physical Exam  GENERAL APPEARANCE: healthy, alert and no distress  EYES: Eyes grossly normal to inspection, PERRL and conjunctivae and sclerae normal  HENT: ear canals and TM's normal, nose and mouth without ulcers or lesions, oropharynx clear and oral " "mucous membranes moist  NECK: no adenopathy, no asymmetry, masses, or scars and thyroid normal to palpation  RESP: lungs clear to auscultation - no rales, rhonchi or wheezes  BREAST: normal without masses, tenderness or nipple discharge and no palpable axillary masses or adenopathy  CV: regular rate and rhythm, normal S1 S2, no S3 or S4, no murmur, click or rub, no peripheral edema and peripheral pulses strong  ABDOMEN: soft, nontender, no hepatosplenomegaly, no masses and bowel sounds normal  MS: no musculoskeletal defects are noted and gait is age appropriate without ataxia  SKIN: no suspicious lesions or rashes  NEURO: Normal strength and tone, sensory exam grossly normal, mentation intact and speech normal  PSYCH: mentation appears normal and affect normal/bright        ASSESSMENT/PLAN:       ICD-10-CM    1. Well adult exam Z00.00 Lipid panel reflex to direct LDL Fasting     **Glucose FUTURE anytime       COUNSELING:  Reviewed preventive health counseling, as reflected in patient instructions       Regular exercise       Healthy diet/nutrition       Vision screening       Osteoporosis Prevention/Bone Health       Colon cancer screening    BP Readings from Last 1 Encounters:   03/06/19 (P) 104/68     Estimated body mass index is 22.94 kg/m  (pended) as calculated from the following:    Height as of this encounter: (P) 1.6 m (5' 3\").    Weight as of this encounter: (P) 58.7 kg (129 lb 8 oz).           reports that  has never smoked. she has never used smokeless tobacco.      Counseling Resources:  ATP IV Guidelines  Pooled Cohorts Equation Calculator  Breast Cancer Risk Calculator  FRAX Risk Assessment  ICSI Preventive Guidelines  Dietary Guidelines for Americans, 2010  USDA's MyPlate  ASA Prophylaxis  Lung CA Screening    MARIANNE Izaguirre [facility]  "

## 2019-03-07 ENCOUNTER — HOSPITAL ENCOUNTER (OUTPATIENT)
Dept: MAMMOGRAPHY | Facility: CLINIC | Age: 49
Discharge: HOME | End: 2019-03-07
Attending: NURSE PRACTITIONER | Admitting: NURSE PRACTITIONER
Payer: COMMERCIAL

## 2019-03-07 DIAGNOSIS — Z12.31 VISIT FOR SCREENING MAMMOGRAM: ICD-10-CM

## 2019-03-07 PROCEDURE — 77067 SCR MAMMO BI INCL CAD: CPT

## 2019-04-11 ENCOUNTER — OFFICE VISIT (OUTPATIENT)
Dept: OBGYN | Facility: OTHER | Age: 49
End: 2019-04-11
Payer: COMMERCIAL

## 2019-04-11 VITALS
DIASTOLIC BLOOD PRESSURE: 70 MMHG | HEART RATE: 86 BPM | BODY MASS INDEX: 22.81 KG/M2 | WEIGHT: 128.75 LBS | SYSTOLIC BLOOD PRESSURE: 120 MMHG

## 2019-04-11 DIAGNOSIS — N81.9 FEMALE GENITAL PROLAPSE, UNSPECIFIED TYPE: Primary | ICD-10-CM

## 2019-04-11 PROCEDURE — 99203 OFFICE O/P NEW LOW 30 MIN: CPT | Performed by: OBSTETRICS & GYNECOLOGY

## 2019-04-11 NOTE — NURSING NOTE
"Chief Complaint   Patient presents with     Pelvic Pain     pelvic pressure       Initial /70 (BP Location: Left arm, Patient Position: Chair, Cuff Size: Adult Regular)   Pulse 86   Wt 58.4 kg (128 lb 12 oz)   LMP 2013   BMI (P) 22.81 kg/m   Estimated body mass index is 22.81 kg/m  (pended) as calculated from the following:    Height as of 3/6/19: (P) 1.6 m (5' 3\").    Weight as of this encounter: 58.4 kg (128 lb 12 oz).  BP completed using cuff size: regular        The following HM Due: NONE      The following patient reported/Care Every where data was sent to:  P ABSTRACT QUALITY INITIATIVES [[id number]]       N/a    Yoko Vazquez CMA  2019           "

## 2019-04-11 NOTE — PROGRESS NOTES
"OB/GYN New Consult      NAME:  Julia Flynn  PCP:  ZeyadLizette  MRN:  [id number]      Impression / Plan     49 year old  with:      ICD-10-CM    1. Female genital prolapse, unspecified type N81.9 PHYSICAL THERAPY REFERRAL       Discussed exam findings.  Patient's symptoms are mild and the prolapse is mild, so recommend she start with pelvic floor physical therapy.  This will also help with the patient's occasional constipation and concerns that she might become incontinent.    Pt will follow up with me as needed.     Chief Complaint     Chief Complaint   Patient presents with     Pelvic Pain     pelvic pressure       HPI     Julia Flynn is a  49 year old female who is seen for questions about prolapse.  She is relatively asymptomatic, but wants to be checked out.      Patient's last menstrual period was 2013.   No bleeding since.      Patient states she has been having a little pressure periodically.  It is worse at the end of the day or if she has more strenuous activity.  She is generally a very active person, walking, running, etc.  Patient has occasional constipation and will rarely have to splint.  She has no significant bladder symptoms or concerns at this time.  If she has any incontinence, this is very rare.  She mostly just feels like she might have incontinence.      History of vaginal delivery x3, largest baby 8 pounds 9 ounces.  No significant lacerations or large episiotomies.  No instrumental deliveries.    She does not feel like she is sitting on anything.  She does not feel like anything is falling out.  She has no difficulties with intercourse.  She feels like she is \"looser\" than when she was younger.    Her mom required pelvic surgery for some sort of prolapse or incontinence.    Date of Last Pap Smear:   Lab Results   Component Value Date    PAP NIL 2017       Problem List     Patient Active Problem List    Diagnosis Date Noted     CARDIOVASCULAR " SCREENING; LDL GOAL LESS THAN 130 07/27/2018     Priority: Medium     Screening for diabetes mellitus 07/27/2018     Priority: Medium     Seasonal allergic rhinitis 04/01/2012     Priority: Medium       Medications     Current Outpatient Medications   Medication     albuterol (PROAIR HFA/PROVENTIL HFA/VENTOLIN HFA) 108 (90 Base) MCG/ACT inhaler     calcium carbonate 500 MG tablet     Cholecalciferol (VITAMIN D PO)     Multiple Vitamin (MULTI-VITAMIN) per tablet     Cyanocobalamin (VITAMIN B12 PO)     No current facility-administered medications for this visit.         Allergies     Allergies   Allergen Reactions     Cefprozil Rash       Past Medical/Surgical History     History reviewed. No pertinent past medical history.    History reviewed. No pertinent surgical history.     Social History     Social History     Socioeconomic History     Marital status:      Spouse name: Not on file     Number of children: Not on file     Years of education: Not on file     Highest education level: Not on file   Occupational History     Not on file   Social Needs     Financial resource strain: Not on file     Food insecurity:     Worry: Not on file     Inability: Not on file     Transportation needs:     Medical: Not on file     Non-medical: Not on file   Tobacco Use     Smoking status: Never Smoker     Smokeless tobacco: Never Used   Substance and Sexual Activity     Alcohol use: No     Drug use: No     Sexual activity: Yes     Partners: Male   Lifestyle     Physical activity:     Days per week: Not on file     Minutes per session: Not on file     Stress: Not on file   Relationships     Social connections:     Talks on phone: Not on file     Gets together: Not on file     Attends Mandaeism service: Not on file     Active member of club or organization: Not on file     Attends meetings of clubs or organizations: Not on file     Relationship status: Not on file     Intimate partner violence:     Fear of current or ex  partner: Not on file     Emotionally abused: Not on file     Physically abused: Not on file     Forced sexual activity: Not on file   Other Topics Concern     Parent/sibling w/ CABG, MI or angioplasty before 65F 55M? Not Asked   Social History Narrative     Not on file       Family History      History reviewed. No pertinent family history.    ROS     A 6 organ review of systems was asked and the pertinent positives and negatives are listed in the HPI. All other organ systems can be considered negative.     Physical Exam   Vitals: /70 (BP Location: Left arm, Patient Position: Chair, Cuff Size: Adult Regular)   Pulse 86   Wt 58.4 kg (128 lb 12 oz)   LMP 08/02/2013   BMI (P) 22.81 kg/m      General: Comfortable, no obvious distress  Psych: Alert and orientated x 3. Appropriate affect, good insight.   : Normal female external genitalia.  No lesions.  Urethral meatus normal.  Speculum exam reveals a normal vaginal vault, normal cervix.  No abnormal discharge.  Bimanual exam reveals a normal, mobile, nontender uterus.  No cervical motion tenderness.  Adnexa nontender with no palpable masses.  She has mild prolapse and an mildly enlarged hiatus.     30 minutes was spent with patient, more than 50% counseling and coordinating care      Allison Aguirre MD

## 2019-05-22 ENCOUNTER — OFFICE VISIT (OUTPATIENT)
Dept: URGENT CARE | Facility: RETAIL CLINIC | Age: 49
End: 2019-05-22
Payer: COMMERCIAL

## 2019-05-22 VITALS
DIASTOLIC BLOOD PRESSURE: 66 MMHG | OXYGEN SATURATION: 96 % | SYSTOLIC BLOOD PRESSURE: 105 MMHG | HEART RATE: 85 BPM | TEMPERATURE: 98.3 F

## 2019-05-22 DIAGNOSIS — J02.9 ACUTE PHARYNGITIS, UNSPECIFIED ETIOLOGY: Primary | ICD-10-CM

## 2019-05-22 LAB — S PYO AG THROAT QL IA.RAPID: NORMAL

## 2019-05-22 PROCEDURE — 87081 CULTURE SCREEN ONLY: CPT | Performed by: INTERNAL MEDICINE

## 2019-05-22 PROCEDURE — 87880 STREP A ASSAY W/OPTIC: CPT | Mod: QW | Performed by: INTERNAL MEDICINE

## 2019-05-22 PROCEDURE — 99213 OFFICE O/P EST LOW 20 MIN: CPT | Performed by: INTERNAL MEDICINE

## 2019-05-22 RX ORDER — CETIRIZINE HYDROCHLORIDE 10 MG/1
10 TABLET ORAL DAILY
COMMUNITY

## 2019-05-22 NOTE — PROGRESS NOTES
[facility] Care Progress Note        Christiano Arellano MD, MPH  05/22/2019        History:      Julia Flynn is a pleasant 49 year old year old female with a chief complaint of nasal congestion and sore throat since 2 days ago.   No fever or chills.   No dyspnea or wheezing.   No smoking history.   No headache or neck pain.  No GI or  symptoms.   No MSK symptoms.         Assessment and Plan:        - BETA STREP GROUP A R/O CULTURE  - RAPID STREP SCREEN: negative.   URI:  Discussed supportive care with the patient  Advised to increase fluid intake and rest.  Patient was advised to use throat lozenges and gargle with salt water for symptomatic relief.  Tylenol 650 mg po for pain q 6 hours prn  F/u w PCP in 4-5 days, earlier if symptoms worsen.                   Physical Exam:      /66   Pulse 85   Temp 98.3  F (36.8  C) (Oral)   LMP 08/02/2013   SpO2 96%      Constitutional: Patient is in no distress The patient is pleasant and cooperative.   HEENT: Head:  Head is atraumatic, normocephalic.    Eyes: Pupils are equal, round and reactive to light and accomodation.  Sclera is non-icteric. No conjunctival injection, or exudate noted. Extraocular motion is intact. Visual acuity is intact bilaterally.  Ears:  External acoustic canals are patent and clear.  There is no erythema and bulging( exudate)  of the ( R/L ) tympanic membrane(s ).   Nose:  Nasal congestion w/o drainage or mucosal ulceration is noted.  Throat:  Oral mucosa is moist.  No oral lesions are noted. Posterior pharyngeal hyperemia w/o exudate noted.     Neck Supple.  There is no cervical lymphadenopathy.  No nuchal rigidity noted.  There is no meningismus.     Cardiovascular: Heart is regular to rate and rhythm.  No murmur is noted.     Lungs: Clear in the anterior and posterior pulmonary fields.   Abdomen: Soft and non-tender.    Back No flank tenderness is noted.   Extremeties No edema, no calf tenderness.   Neuro: No focal  deficit.   Skin No petechiae or purpura is noted.  There is no rash.   Mood Normal              Data:      All new lab and imaging data was reviewed.   Results for orders placed or performed during the hospital encounter of 03/07/19   *MA Screening Digital Bilateral    Narrative    SCREENING MAMMOGRAM, BILATERAL, DIGITAL w/CAD - 3/7/2019 10:49 AM    BREAST SYMPTOMS: No current breast complaints.     COMPARISON:  12/17/17, 7/14/16, 7/7/15, 7/2/13.    BREAST DENSITY: Almost entirely fat.    COMMENTS: No findings of suspicion for malignancy.       Impression    IMPRESSION: BI-RADS CATEGORY: 1 -  Negative    RECOMMENDED FOLLOW-UP: Annual Mammography.    Exam results letter mailed to patient.      JUVENAL VU MD

## 2019-05-24 LAB
BACTERIA SPEC CULT: NORMAL
SPECIMEN SOURCE: NORMAL

## 2019-08-26 ENCOUNTER — OFFICE VISIT (OUTPATIENT)
Dept: FAMILY MEDICINE | Facility: OTHER | Age: 49
End: 2019-08-26
Payer: COMMERCIAL

## 2019-08-26 VITALS
SYSTOLIC BLOOD PRESSURE: 104 MMHG | HEIGHT: 63 IN | TEMPERATURE: 98.7 F | WEIGHT: 131 LBS | RESPIRATION RATE: 16 BRPM | OXYGEN SATURATION: 100 % | HEART RATE: 74 BPM | DIASTOLIC BLOOD PRESSURE: 76 MMHG | BODY MASS INDEX: 23.21 KG/M2

## 2019-08-26 DIAGNOSIS — L03.113 CELLULITIS OF RIGHT UPPER EXTREMITY: Primary | ICD-10-CM

## 2019-08-26 DIAGNOSIS — M70.31 BURSITIS OF RIGHT ELBOW, UNSPECIFIED BURSA: ICD-10-CM

## 2019-08-26 PROCEDURE — 99213 OFFICE O/P EST LOW 20 MIN: CPT | Performed by: PHYSICIAN ASSISTANT

## 2019-08-26 RX ORDER — SULFAMETHOXAZOLE/TRIMETHOPRIM 800-160 MG
1 TABLET ORAL 2 TIMES DAILY
Qty: 14 TABLET | Refills: 0 | Status: SHIPPED | OUTPATIENT
Start: 2019-08-26 | End: 2019-08-30

## 2019-08-26 ASSESSMENT — MIFFLIN-ST. JEOR: SCORE: 1188.34

## 2019-08-26 NOTE — PROGRESS NOTES
Subjective     Julia Flynn is a 49 year old female who presents to clinic today for the following health issues:    HPI   Right elbow pain with swelling and redness started yesterday    Patient presents today for evaluation of right elbow pain and swelling. Was painting a lot over the weekend. Has history of tennis elbow on this side so did not think of it much initially. When she looked at it last night it was red and swollen. She marilee a line around the redness. This has since spread. Area is warm. Feels stiff. Has had cellulitis in the past. No fevers.     Patient Active Problem List   Diagnosis     Seasonal allergic rhinitis     CARDIOVASCULAR SCREENING; LDL GOAL LESS THAN 130     Screening for diabetes mellitus     No past surgical history on file.    Social History     Tobacco Use     Smoking status: Never Smoker     Smokeless tobacco: Never Used   Substance Use Topics     Alcohol use: No     No family history on file.      Current Outpatient Medications   Medication Sig Dispense Refill     albuterol (PROAIR HFA/PROVENTIL HFA/VENTOLIN HFA) 108 (90 Base) MCG/ACT inhaler Inhale 2 puffs into the lungs every 6 hours as needed for shortness of breath / dyspnea or wheezing 1 Inhaler 0     calcium carbonate 500 MG tablet Take 1 tablet by mouth 2 times daily Reported on 2/13/2017       cetirizine (ZYRTEC) 10 MG tablet Take 10 mg by mouth daily       Cholecalciferol (VITAMIN D PO) Reported on 2/13/2017       Cyanocobalamin (VITAMIN B12 PO) Reported on 2/13/2017       Multiple Vitamin (MULTI-VITAMIN) per tablet Take 1 tablet by mouth daily Reported on 2/13/2017       sulfamethoxazole-trimethoprim (BACTRIM DS/SEPTRA DS) 800-160 MG tablet Take 1 tablet by mouth 2 times daily for 7 days 14 tablet 0     Allergies   Allergen Reactions     Ciprofloxacin      Cefprozil Rash     BP Readings from Last 3 Encounters:   08/26/19 104/76   05/22/19 105/66   04/11/19 120/70    Wt Readings from Last 3 Encounters:   08/26/19 59.4  "kg (131 lb)   04/11/19 58.4 kg (128 lb 12 oz)   03/06/19 (P) 58.7 kg (129 lb 8 oz)        Reviewed and updated as needed this visit by Provider         Review of Systems   ROS COMP: Constitutional, HEENT, cardiovascular, pulmonary, gi and gu systems are negative, except as otherwise noted.      Objective    Ht 1.6 m (5' 3\")   Wt 59.4 kg (131 lb)   LMP 08/02/2013   BMI 23.21 kg/m    Body mass index is 23.21 kg/m .  Physical Exam   GENERAL: healthy, alert and no distress  MS: no gross musculoskeletal defects noted, no edema  SKIN: Soft tissue swelling and erythema surrounding the right elbow. Area is warm and tender. Full ROM.   PSYCH: mentation appears normal, affect normal/bright    Diagnostic Test Results:  Labs reviewed in Epic  none         Assessment & Plan     1. Cellulitis of right upper extremity  Symptoms consistent with cellulitis/infective bursitis. Will start patient on antibiotics as below. Encouraged application of ice and rest. Close monitoring of symptoms. Patient will follow-up in clinic if new symptoms develop or current symptoms fail to improve.  - sulfamethoxazole-trimethoprim (BACTRIM DS/SEPTRA DS) 800-160 MG tablet; Take 1 tablet by mouth 2 times daily for 7 days  Dispense: 14 tablet; Refill: 0    2. Bursitis of right elbow, unspecified bursa    The patient indicates understanding of these issues and agrees with the plan.    Louisa Le PA-C  [facility]    "

## 2019-08-28 ENCOUNTER — NURSE TRIAGE (OUTPATIENT)
Dept: FAMILY MEDICINE | Facility: CLINIC | Age: 49
End: 2019-08-28

## 2019-08-28 NOTE — TELEPHONE ENCOUNTER
Reason for call:  Patient reporting a symptom    Symptom or request: cellulitis     Duration (how long have symptoms been present):  4 days     Have you been treated for this before? Yes    Additional comments: has questions regarding her arm and swelling. Asking to talk to a nurse.     Phone Number patient can be reached at:  Cell number on file:    Telephone Information:   Mobile [phone]       Best Time:  Any       Can we leave a detailed message on this number:  YES    Call taken on 8/28/2019 at 1:08 PM by Nicolasa Rosario

## 2019-08-28 NOTE — TELEPHONE ENCOUNTER
Patient is noting some improvement, but requested to discuss home care measures again.   Questions answered.     Fabiola Talley, RN, BSN

## 2019-08-30 ENCOUNTER — OFFICE VISIT (OUTPATIENT)
Dept: INTERNAL MEDICINE | Facility: CLINIC | Age: 49
End: 2019-08-30
Payer: COMMERCIAL

## 2019-08-30 VITALS
OXYGEN SATURATION: 97 % | SYSTOLIC BLOOD PRESSURE: 122 MMHG | RESPIRATION RATE: 16 BRPM | BODY MASS INDEX: 23.33 KG/M2 | DIASTOLIC BLOOD PRESSURE: 70 MMHG | WEIGHT: 131.7 LBS | HEART RATE: 90 BPM | TEMPERATURE: 97.5 F

## 2019-08-30 DIAGNOSIS — L03.113 CELLULITIS OF RIGHT UPPER EXTREMITY: ICD-10-CM

## 2019-08-30 DIAGNOSIS — M70.21 OLECRANON BURSITIS OF RIGHT ELBOW: Primary | ICD-10-CM

## 2019-08-30 PROCEDURE — 99213 OFFICE O/P EST LOW 20 MIN: CPT | Performed by: INTERNAL MEDICINE

## 2019-08-30 RX ORDER — SULFAMETHOXAZOLE/TRIMETHOPRIM 800-160 MG
1 TABLET ORAL 2 TIMES DAILY
Qty: 14 TABLET | Refills: 0 | Status: SHIPPED | OUTPATIENT
Start: 2019-08-30 | End: 2019-10-08

## 2019-08-30 ASSESSMENT — PAIN SCALES - GENERAL: PAINLEVEL: SEVERE PAIN (7)

## 2019-08-30 NOTE — PROGRESS NOTES
Subjective     Julia Flynn is a 49 year old female who presents to clinic today for the following health issues:    HPI   Chief Complaint   Patient presents with     Cellulitis     states that it started wtih bursitis in the right elbow and turned into cellulitis.  States that the swelling is going up her arm and it is warm to the touch.       Felt a little better yesterday, had been resting it and icing it. Then used it more today.      Some redness but better then earlier in the week.    No past medical history on file.  Current Outpatient Medications   Medication     calcium carbonate 500 MG tablet     cetirizine (ZYRTEC) 10 MG tablet     Cholecalciferol (VITAMIN D PO)     Multiple Vitamin (MULTI-VITAMIN) per tablet     sulfamethoxazole-trimethoprim (BACTRIM DS/SEPTRA DS) 800-160 MG tablet     albuterol (PROAIR HFA/PROVENTIL HFA/VENTOLIN HFA) 108 (90 Base) MCG/ACT inhaler     Cyanocobalamin (VITAMIN B12 PO)     No current facility-administered medications for this visit.      Social History     Tobacco Use     Smoking status: Never Smoker     Smokeless tobacco: Never Used   Substance Use Topics     Alcohol use: No     Drug use: No       Physical Exam  /70   Pulse 90   Temp 97.5  F (36.4  C) (Temporal)   Resp 16   Wt 59.7 kg (131 lb 11.2 oz)   LMP 08/02/2013   SpO2 97%   BMI 23.33 kg/m    General Appearance-healthy, alert, no distress  Right elbow does have an olecranon bursitis on it, there is mild erythema on the underside of the forearm, really not tender to touch good range of motion other than mild swelling.    Assessment:  Healthy 49-year-old woman who comes in with a recheck after finding out like a bursitis with some surrounding cellulitis earlier this week.  She is been on Bactrim and has gotten better but today she use the elbow more got nervous that it was getting worse with more pain and swelling.  I did reassure her that I do not see any worrisome signs.  Due to the difficulty of  treating the bursitis we will extend her antibiotics for another week.  Warned her that if she gets worse or any red streaks going up her arm, fevers.  Goes to be signed she should come into the emergency room this weekend.    Electronically signed by Hossein Michael MD

## 2019-08-30 NOTE — TELEPHONE ENCOUNTER
Pt calling today and stating her arm is worse today. She's getting a little worried about it now. She would like a nurse to call her back today and discuss. Can a provider please work her in today to get her arm looked at? Please call her and advise.

## 2019-08-30 NOTE — TELEPHONE ENCOUNTER
Reason for Disposition    MODERATE arm swelling (e.g., puffiness or swollen feeling of entire arm )    Additional Information    Negative: Severe difficulty breathing (e.g., struggling for each breath, speaks in single words)    Negative: Sounds like a life-threatening emergency to the triager    Negative: Chest pain    Negative: Followed an arm injury    Negative: Small area of LOCALIZED swelling followed an insect bite to the area    Negative: Swelling of wrist is main problem    Negative: Swelling of elbow is main symptom    Negative: Cast problems or questions    Negative: Pain, redness, swelling, or pus at IV Site    Negative: SEVERE arm swelling (e.g., all of arm looks swollen)    Negative: [1] MODERATE arm swelling (e.g., puffiness or swollen feeling of entire arm ) and [2] PICC Line    Negative: [1] MODERATE arm swelling and [2] central venous catheter (central line, internal jugular line)    Negative: Difficulty breathing at rest    Negative: Looks like a broken bone or dislocated joint (e.g., crooked or deformed)    Negative: Entire hand is cool or blue in comparison to other side    Negative: [1] Can't use arm or can barely use arm AND [2] new onset    Negative: [1] Difficulty breathing with exertion (e.g., walking) AND [2] new onset or worsening    Negative: [1] Red area or streak AND [2] fever    Negative: [1] Swelling is painful to touch AND [2] fever    Negative: [1] Cast arm AND [2] now increased pain    Negative: Patient sounds very sick or weak to the triager    Negative: [1] Pregnant > 20 weeks AND [2] face swelling    Negative: [1] Pregnant > 20 weeks AND [2] new blurred vision or vision changes    Negative: [1] Postpartum (i.e. < 6 weeks since delivery) AND [2] new blurred vision or vision changes    Negative: [1] Postpartum (i.e. < 6 weeks since delivery) AND [2] face swelling    Negative: [1] Red area or streak [2] large (> 2 in. or 5 cm)    Answer Assessment - Initial Assessment  "Questions  1. ONSET: \"When did the swelling start?\" (e.g., minutes, hours, days)      Monday, seen at the clinic.  Diagnosed with cellulitis    2. LOCATION: \"What part of the arm is swollen?\"  \"Are both arms swollen or just one arm?\"      Wrist and elbow  3. SEVERITY: \"How bad is the swelling?\" (e.g., localized; mild, moderate, severe)    - LOCALIZED: Small area of puffiness or swelling on just one arm    - JOINT SWELLING: Swelling of one joint    - MILD: Puffiness or swelling of hand    - MODERATE: Puffiness or swollen feeling of entire arm     - SEVERE: All of arm looks swollen; pitting edema      Moderate  4. REDNESS: \"Does the swelling look red or infected?\"      Redness is the same as when seen on Monday  5. PAIN: \"Is the swelling painful to touch?\" If so, ask: \"How painful is it?\"   (Scale 1-10; mild, moderate or severe)      Mild to moderate depending on movement  6. FEVER: \"Do you have a fever?\" If so, ask: \"What is it, how was it measured, and when did it start?\"       No  7. CAUSE: \"What do you think is causing the arm swelling?\"      cellulitis  8. MEDICAL HISTORY: \"Do you have a history of heart failure, kidney disease, liver failure, or cancer?\"      No  9. RECURRENT SYMPTOM: \"Have you had arm swelling before?\" If so, ask: \"When was the last time?\" \"What happened that time?\"      NA  10. OTHER SYMPTOMS: \"Do you have any other symptoms?\" (e.g., chest pain, difficulty breathing)        No  11. PREGNANCY: \"Is there any chance you are pregnant?\" \"When was your last menstrual period?\"        NA    Protocols used: ARM SWELLING AND EDEMA-A-AH      "

## 2019-09-12 NOTE — PROGRESS NOTES
Subjective     Julia Flynn is a 49 year old female who presents to clinic today for the following health issues:    HPI   Concern - Recheck Bursitis  Onset: 2 weeks or so     Description:   Cellulitis has improved. Finished second dosing of Bactrim. Elbow is much better than it was but still bothersome and red.     Mild inflammation right olecranon area negative for erythema, warmth. Is mildly tender    Patient Active Problem List   Diagnosis     Seasonal allergic rhinitis     CARDIOVASCULAR SCREENING; LDL GOAL LESS THAN 130     Screening for diabetes mellitus     Eczema     History reviewed. No pertinent surgical history.    Social History     Tobacco Use     Smoking status: Never Smoker     Smokeless tobacco: Never Used   Substance Use Topics     Alcohol use: No     History reviewed. No pertinent family history.      Current Outpatient Medications   Medication Sig Dispense Refill     albuterol (PROAIR HFA/PROVENTIL HFA/VENTOLIN HFA) 108 (90 Base) MCG/ACT inhaler Inhale 2 puffs into the lungs every 6 hours as needed for shortness of breath / dyspnea or wheezing 1 Inhaler 0     calcium carbonate 500 MG tablet Take 1 tablet by mouth 2 times daily Reported on 2/13/2017       cetirizine (ZYRTEC) 10 MG tablet Take 10 mg by mouth daily       Cholecalciferol (VITAMIN D PO) Reported on 2/13/2017       Cyanocobalamin (VITAMIN B12 PO) Reported on 2/13/2017       Multiple Vitamin (MULTI-VITAMIN) per tablet Take 1 tablet by mouth daily Reported on 2/13/2017       sulfamethoxazole-trimethoprim (BACTRIM DS/SEPTRA DS) 800-160 MG tablet Take 1 tablet by mouth 2 times daily (Patient not taking: Reported on 9/13/2019) 14 tablet 0     Allergies   Allergen Reactions     Ciprofloxacin      Cefprozil Rash     Recent Labs   Lab Test 07/27/18  0835 07/14/16 02/28/14 07/02/13   *  --   --  123   HDL 84  --   --  78   TRIG 75  --   --  60   TSH  --  2.66 2.99  --       BP Readings from Last 3 Encounters:   09/13/19 124/78    08/30/19 122/70   08/26/19 104/76    Wt Readings from Last 3 Encounters:   09/13/19 59 kg (130 lb)   08/30/19 59.7 kg (131 lb 11.2 oz)   08/26/19 59.4 kg (131 lb)                    Reviewed and updated as needed this visit by Provider         Review of Systems   ROS COMP: Constitutional, HEENT, cardiovascular, pulmonary, GI, , musculoskeletal, neuro, skin, endocrine and psych systems are negative, except as otherwise noted.      Objective    LMP 08/02/2013   There is no height or weight on file to calculate BMI.  Physical Exam   GENERAL: healthy, alert and no distress  RESP: lungs clear to auscultation - no rales, rhonchi or wheezes  CV: regular rate and rhythm, normal S1 S2, no S3 or S4, no murmur, click or rub, no peripheral edema and peripheral pulses strong  MS: right olecranon as noted above.   SKIN: no suspicious lesions or rashes        Assessment & Plan     1. Olecranon bursitis, right elbow  Improved no further treatment recommended monitor closely          Patient Instructions   Looks great today do not recommend further antibiotic at this time finish course you have.           No follow-ups on file.    MARIANNE Durbin [facility]

## 2019-09-13 ENCOUNTER — OFFICE VISIT (OUTPATIENT)
Dept: FAMILY MEDICINE | Facility: OTHER | Age: 49
End: 2019-09-13
Payer: COMMERCIAL

## 2019-09-13 VITALS
TEMPERATURE: 98.2 F | WEIGHT: 130 LBS | HEART RATE: 78 BPM | BODY MASS INDEX: 23.03 KG/M2 | SYSTOLIC BLOOD PRESSURE: 124 MMHG | OXYGEN SATURATION: 99 % | DIASTOLIC BLOOD PRESSURE: 78 MMHG | RESPIRATION RATE: 16 BRPM

## 2019-09-13 DIAGNOSIS — M70.21 OLECRANON BURSITIS, RIGHT ELBOW: Primary | ICD-10-CM

## 2019-09-13 PROCEDURE — 99213 OFFICE O/P EST LOW 20 MIN: CPT | Performed by: NURSE PRACTITIONER

## 2019-10-08 ENCOUNTER — OFFICE VISIT (OUTPATIENT)
Dept: FAMILY MEDICINE | Facility: CLINIC | Age: 49
End: 2019-10-08
Payer: COMMERCIAL

## 2019-10-08 VITALS
OXYGEN SATURATION: 96 % | HEART RATE: 68 BPM | WEIGHT: 131.2 LBS | SYSTOLIC BLOOD PRESSURE: 116 MMHG | RESPIRATION RATE: 16 BRPM | TEMPERATURE: 98.8 F | BODY MASS INDEX: 23.24 KG/M2 | DIASTOLIC BLOOD PRESSURE: 74 MMHG

## 2019-10-08 DIAGNOSIS — M70.21 OLECRANON BURSITIS OF RIGHT ELBOW: Primary | ICD-10-CM

## 2019-10-08 PROCEDURE — 99213 OFFICE O/P EST LOW 20 MIN: CPT | Performed by: PHYSICIAN ASSISTANT

## 2019-10-08 RX ORDER — PREDNISONE 5 MG/1
5 TABLET ORAL 2 TIMES DAILY
Qty: 10 TABLET | Refills: 0 | Status: SHIPPED | OUTPATIENT
Start: 2019-10-08 | End: 2020-01-27

## 2019-10-08 ASSESSMENT — PAIN SCALES - GENERAL: PAINLEVEL: MODERATE PAIN (5)

## 2019-10-08 NOTE — PATIENT INSTRUCTIONS
Patient Education     Bursitis of the Elbow (Olecranon)  Your elbow joint contains a small fluid-filled sac called a bursa. The bursa helps the muscles and tendons move smoothly over the bone. It also cushions and protects your elbow. Bursitis is when the bursa is inflamed or swollen. This is most often due to overuse of or injury to the elbow. Symptoms include swelling and pain. If the elbow is red and feels warm to the touch, the bursa itself may be infected.  In most cases, elbow bursitis resolves with medicine and self-care at home. It may take several weeks for the bursa to heal and the swelling to go away. In some cases, your healthcare provider may drain excess fluid from the bursa. Or, he or she may inject medicine directly into the bursa to help relieve symptoms. In severe cases, you may need surgery to remove the bursa may. If there is concern that the bursa is infected, your healthcare provider may prescribe antibiotics to treat the infection.    Home care  Your healthcare provider may prescribe medicine to help relieve pain and swelling. This may be an over-the-counter pain reliever or prescription pain medicine. Take all medicines as directed. To help treat or prevent infection, your provider may prescribe antibiotics. If these are prescribed, take them as directed until they are gone.  The following are general care guidelines:    Apply an ice pack or bag of frozen peas wrapped in a thin towel to your elbow for 15 to 20 minutes at a time. Do this 3 to 4 times a day until pain and swelling improve.    Keep your elbow raised above the level of your heart whenever possible. This helps reduce swelling. When sitting or lying down, place your arm on a pillow that rests on your chest or on a pillow at your side.    Use an elastic wrap around the elbow joint to compress the area while it is healing. Make the wrap snug but not tight to the point of causing pain.    Rest your elbow to give it time to heal. You  may need to wear an elbow pad to help protect and limit the movement of your elbow. During and after healing, avoid leaning on your elbows.  Follow-up care  Follow up with your healthcare provider, or as advised. If you have been referred to a specialist, make that appointment promptly.  When to seek medical advice  Call your healthcare provider right away if any of these occur:    Fever of 100.4 F (38 C) or higher, or as advised    Chills    Increased pain, swelling, warmth, redness, or drainage from the joint    Trouble moving the elbow joint    Numbness or tingling in the hand    Severe pain or swelling in forearm or hand    Loss of pink color and slow return of color after squeezing fingertip or hand  Date Last Reviewed: 6/1/2016 2000-2018 [employer]. [address] [place] [address], [place] [address]. All rights reserved. This information is not intended as a substitute for professional medical care. Always follow your healthcare professional's instructions.

## 2019-10-08 NOTE — NURSING NOTE
Health Maintenance Due   Topic Date Due     HIV SCREENING  01/16/1985     DTAP/TDAP/TD IMMUNIZATION (1 - Tdap) 08/13/2003     INFLUENZA VACCINE (1) 09/01/2019     Lakeshia CURRIE LPN

## 2019-10-08 NOTE — PROGRESS NOTES
Subjective     Julia Flynn is a 49 year old female who presents to clinic today for the following health issues:    HPI   Concern - recheck right elbow  Onset: recheck    Description:   Recheck right elbow    Intensity: mild, moderate    Progression of Symptoms:  improving    Accompanying Signs & Symptoms:  Swelling, red    Previous history of similar problem:   no    Precipitating factors:   Worsened by: pressure on her elbow    Alleviating factors:  Improved by: ice    Therapies Tried and outcome: ice, ibuprofen,slight relief; bactrim; good relief    Patient is a 49 year old female who presents today with concern about ongoing pain in the right elbow. She was initially seen 08/26/2019 and diagnosed with cellulitis due to expanding erythema and was started on bactrim. Follow up appointment on 08/30 and had antibiotic extended for an additional week. Seen on 09/13 and was given reassurance the the elbow was improving, advised to continue RICE therapy. Today she reports that despite the RICE therapy she continues to experience tenderness over the tip of the elbow and spasms with lifting. She does recall banging the elbow on various pieces of furniture over the past weeks as it was difficult to dress herself or adjust to complete daily activities. The swelling has all but resolved, but she states that it continues to feel warm. Denies redness, fever, pain with movement of joint, numbness or tingling, or weakness.     Reviewed and updated as needed this visit by Provider         Review of Systems   ROS COMP: Constitutional, HEENT, cardiovascular, pulmonary, gi and gu systems are negative, except as otherwise noted.      Objective    /74 (BP Location: Left arm, Patient Position: Chair, Cuff Size: Adult Regular)   Pulse 68   Temp 98.8  F (37.1  C) (Oral)   Resp 16   Wt 59.5 kg (131 lb 3.2 oz)   LMP 08/02/2013   SpO2 96%   BMI 23.24 kg/m    Body mass index is 23.24 kg/m .  Physical Exam   GENERAL:  healthy, alert and no distress  RESP: lungs clear to auscultation - no rales, rhonchi or wheezes  CV: regular rate and rhythm, normal S1 S2, no S3 or S4, no murmur, click or rub, no peripheral edema and peripheral pulses strong  MS: no gross musculoskeletal defects noted, no edema, tenderness to palpation over the right olecranon with very mild erythema, normal AROM of elbows/shoulders/wrists, 5/5 bilateral  strength and resisted extension, 4/5 strength with resisted flexion  SKIN: right elbow slightly warmer to touch than left   NEURO: Normal strength and tone, mentation intact and speech normal    Diagnostic Test Results:  Labs reviewed in Epic        Assessment & Plan       ICD-10-CM    1. Olecranon bursitis of right elbow M70.21 predniSONE (DELTASONE) 5 MG tablet        Discussed with patient deferring any imaging as fracture less likely given history of treatment and banging on furniture as well as exam findings are more consistent with bursitis. Recommend that the patient start anti-inflammatory twice daily for next 5 days. Had to do lower dose due to formulary coverage. Educational information sent home with patient. Patient will call if not improving as expected and can place order for imaging pending symptoms.       Return in about 6 months (around 4/8/2020) for Return for scheduled annual checkup with PCP.    Aniceto Park PA-C  [facility]

## 2019-11-02 ENCOUNTER — IMMUNIZATION (OUTPATIENT)
Dept: URGENT CARE | Facility: RETAIL CLINIC | Age: 49
End: 2019-11-02
Payer: COMMERCIAL

## 2019-11-02 PROCEDURE — 90471 IMMUNIZATION ADMIN: CPT | Performed by: NURSE PRACTITIONER

## 2019-11-02 PROCEDURE — 90686 IIV4 VACC NO PRSV 0.5 ML IM: CPT | Performed by: NURSE PRACTITIONER

## 2019-11-07 ENCOUNTER — HEALTH MAINTENANCE LETTER (OUTPATIENT)
Age: 49
End: 2019-11-07

## 2019-12-09 ENCOUNTER — TELEPHONE (OUTPATIENT)
Dept: INTERNAL MEDICINE | Facility: CLINIC | Age: 49
End: 2019-12-09

## 2019-12-09 NOTE — TELEPHONE ENCOUNTER
Reached out to patient. Relayed message. Requesting to be seen by Lizette Jeffers. Aware of the 730 opening on 12/11/2019 but that time will not work with work. Please advise.

## 2019-12-09 NOTE — TELEPHONE ENCOUNTER
Please see [employer] IP message below.  Thank you.        From: Julia Flynn      Sent: 12/9/2019  2:39 PM CST        To: Patient Appointment Schedule Request Message List   Subject: RE: Appointment Request      Appointment Request From: Julia Flynn      With Provider: Hossein Michael MD [[facility]]      Preferred Date Range: 12/11/2019 - 12/18/2019      Preferred Times: Any time      Reason for visit: Request an Appointment      Comments:   some joint pain/questions

## 2019-12-10 NOTE — TELEPHONE ENCOUNTER
Left message for patient to call back and speak with any .    Thank you,  Teresa Joshi   for [facility]

## 2019-12-11 NOTE — TELEPHONE ENCOUNTER
2nd attempt to reach pt- left another message. Routing back to team to send letter.  Thank you,  Katie Young- Patient Representative

## 2019-12-23 DIAGNOSIS — Z00.00 WELL ADULT EXAM: ICD-10-CM

## 2019-12-23 LAB
CHOLEST SERPL-MCNC: 211 MG/DL
GLUCOSE SERPL-MCNC: 91 MG/DL (ref 70–99)
HDLC SERPL-MCNC: 78 MG/DL
LDLC SERPL CALC-MCNC: 108 MG/DL
NONHDLC SERPL-MCNC: 133 MG/DL
TRIGL SERPL-MCNC: 127 MG/DL

## 2019-12-23 PROCEDURE — 36415 COLL VENOUS BLD VENIPUNCTURE: CPT | Performed by: NURSE PRACTITIONER

## 2019-12-23 PROCEDURE — 82947 ASSAY GLUCOSE BLOOD QUANT: CPT | Performed by: NURSE PRACTITIONER

## 2019-12-23 PROCEDURE — 80061 LIPID PANEL: CPT | Performed by: NURSE PRACTITIONER

## 2019-12-24 ENCOUNTER — TELEPHONE (OUTPATIENT)
Dept: FAMILY MEDICINE | Facility: CLINIC | Age: 49
End: 2019-12-24

## 2019-12-24 NOTE — TELEPHONE ENCOUNTER
----- Message from Александр Cherry NP sent at 12/24/2019 10:57 AM CST -----  Please call with results. Please ensure that cholesterol is very mildly elevated and has actually improved from last year. We will recheck periodically.      Александр Cherry NP on 12/24/2019 at 10:56 AM

## 2019-12-24 NOTE — TELEPHONE ENCOUNTER
Patient was informed that her cholesterol is very mildly elevated and has actually improved from last year. Recommends to recheck it periodically.    Katrin Topete, CMA

## 2020-01-06 ENCOUNTER — OFFICE VISIT (OUTPATIENT)
Dept: FAMILY MEDICINE | Facility: CLINIC | Age: 50
End: 2020-01-06
Payer: COMMERCIAL

## 2020-01-06 VITALS
RESPIRATION RATE: 18 BRPM | SYSTOLIC BLOOD PRESSURE: 104 MMHG | OXYGEN SATURATION: 97 % | HEART RATE: 100 BPM | WEIGHT: 136 LBS | TEMPERATURE: 97.8 F | DIASTOLIC BLOOD PRESSURE: 68 MMHG | BODY MASS INDEX: 24.09 KG/M2

## 2020-01-06 DIAGNOSIS — Z76.89 ENCOUNTER TO ESTABLISH CARE: ICD-10-CM

## 2020-01-06 DIAGNOSIS — M25.521 ARTHRALGIA OF RIGHT ELBOW: Primary | ICD-10-CM

## 2020-01-06 PROCEDURE — 99213 OFFICE O/P EST LOW 20 MIN: CPT | Performed by: NURSE PRACTITIONER

## 2020-01-06 ASSESSMENT — PAIN SCALES - GENERAL: PAINLEVEL: NO PAIN (0)

## 2020-01-06 NOTE — PROGRESS NOTES
Subjective     Julia Flynn is a 49 year old female who presents to clinic today for the following health issues:    Patient presents today for follow-up of right elbow joint pain that is been ongoing since August of last year.  She was treated for a craning bursitis and cellulitis.  She is on  in her work at her desk along with her constant typing will aggravate the elbow at times.  No significant exacerbations since August.  No new swelling, induration, or erythema.  Patient is actually looking to establish care for a new provider and wanted to discuss the bursitis in case she has an exacerbation now or in the near future.  She has no other acute symptoms of concern at this time.  No recent fevers or chills.  Patient wanted to review her medical history and health maintenance needs moving forward.  No new depression or anxiety that is concerning.    HPI   Joint Pain    Onset: August     Description:   Location: left elbow and right elbow  Character: Sharp    Intensity: moderate    Progression of Symptoms: intermittent    Accompanying Signs & Symptoms:  Other symptoms: none    History:   Previous similar pain: YES-      Precipitating factors:   Trauma or overuse: YES    Alleviating factors:  Improved by: right elbow had cellulitis, in August      Therapies Tried and outcome: Steroids           Patient Active Problem List   Diagnosis     Seasonal allergic rhinitis     CARDIOVASCULAR SCREENING; LDL GOAL LESS THAN 130     Screening for diabetes mellitus     Eczema     No past surgical history on file.    Social History     Tobacco Use     Smoking status: Never Smoker     Smokeless tobacco: Never Used   Substance Use Topics     Alcohol use: No     No family history on file.      Current Outpatient Medications   Medication Sig Dispense Refill     albuterol (PROAIR HFA/PROVENTIL HFA/VENTOLIN HFA) 108 (90 Base) MCG/ACT inhaler Inhale 2 puffs into the lungs every 6 hours as needed for shortness  of breath / dyspnea or wheezing 1 Inhaler 0     calcium carbonate 500 MG tablet Take 1 tablet by mouth 2 times daily Reported on 2/13/2017       cetirizine (ZYRTEC) 10 MG tablet Take 10 mg by mouth daily       Cholecalciferol (VITAMIN D PO) Reported on 2/13/2017       Cyanocobalamin (VITAMIN B12 PO) Reported on 2/13/2017       Multiple Vitamin (MULTI-VITAMIN) per tablet Take 1 tablet by mouth daily Reported on 2/13/2017       Allergies   Allergen Reactions     Ciprofloxacin      Cefprozil Rash         Reviewed and updated as needed this visit by Provider         Review of Systems   ROS COMP: Constitutional, HEENT, cardiovascular, pulmonary, gi and gu systems are negative, except as otherwise noted.      Objective    /68   Pulse 100   Temp 97.8  F (36.6  C) (Temporal)   Resp 18   Wt 61.7 kg (136 lb)   LMP 08/02/2013   SpO2 97%   BMI 24.09 kg/m    Body mass index is 24.09 kg/m .  Physical Exam   GENERAL: healthy, alert and no distress  NECK: no adenopathy, no asymmetry, masses, or scars and thyroid normal to palpation  RESP: lungs clear to auscultation - no rales, rhonchi or wheezes  CV: regular rates and rhythm and no peripheral edema  ABDOMEN: soft, nontender  MS: no gross musculoskeletal defects noted, no edema  SKIN: no suspicious lesions or rashes  NEURO: Normal strength and tone, mentation intact and speech normal  PSYCH: mentation appears normal, affect normal/bright    Diagnostic Test Results:  Labs reviewed in Epic        Assessment & Plan     1. Arthralgia of right elbow  -Chronic joint pain in the right elbow some on the left.  Has a history of situs of the right elbow.  She has been treated with steroids and antibiotics in the past no new exacerbation at this time. Discussed home care to prevent the bursitis.   - If the bursitis becomes more of an issue we will refer her to sports medicine.     2. Encounter to establish care  - Discuss health maintenance needs and medical history.  - Patient  ultimately decided to establish with our team.   - We will get her set up for physical in March.     Patient verbalized understanding of plan of care moving forward.          Return in about 2 months (around 3/6/2020) for Physical Exam.    Александр Cherry NP  [facility]

## 2020-01-06 NOTE — PATIENT INSTRUCTIONS
Patient Education     Bursitis of the Elbow (Olecranon)  Your elbow joint contains a small fluid-filled sac called a bursa. The bursa helps the muscles and tendons move smoothly over the bone. It also cushions and protects your elbow. Bursitis is when the bursa is inflamed or swollen. This is most often due to overuse of or injury to the elbow. Symptoms include swelling and pain. If the elbow is red and feels warm to the touch, the bursa itself may be infected.  In most cases, elbow bursitis resolves with medicine and self-care at home. It may take several weeks for the bursa to heal and the swelling to go away. In some cases, your healthcare provider may drain excess fluid from the bursa. Or, he or she may inject medicine directly into the bursa to help relieve symptoms. In severe cases, you may need surgery to remove the bursa may. If there is concern that the bursa is infected, your healthcare provider may prescribe antibiotics to treat the infection.    Home care  Your healthcare provider may prescribe medicine to help relieve pain and swelling. This may be an over-the-counter pain reliever or prescription pain medicine. Take all medicines as directed. To help treat or prevent infection, your provider may prescribe antibiotics. If these are prescribed, take them as directed until they are gone.  The following are general care guidelines:    Apply an ice pack or bag of frozen peas wrapped in a thin towel to your elbow for 15 to 20 minutes at a time. Do this 3 to 4 times a day until pain and swelling improve.    Keep your elbow raised above the level of your heart whenever possible. This helps reduce swelling. When sitting or lying down, place your arm on a pillow that rests on your chest or on a pillow at your side.    Use an elastic wrap around the elbow joint to compress the area while it is healing. Make the wrap snug but not tight to the point of causing pain.    Rest your elbow to give it time to heal. You  may need to wear an elbow pad to help protect and limit the movement of your elbow. During and after healing, avoid leaning on your elbows.  Follow-up care  Follow up with your healthcare provider, or as advised. If you have been referred to a specialist, make that appointment promptly.  When to seek medical advice  Call your healthcare provider right away if any of these occur:    Fever of 100.4 F (38 C) or higher, or as advised    Chills    Increased pain, swelling, warmth, redness, or drainage from the joint    Trouble moving the elbow joint    Numbness or tingling in the hand    Severe pain or swelling in forearm or hand    Loss of pink color and slow return of color after squeezing fingertip or hand  Date Last Reviewed: 6/1/2016 2000-2019 [employer]. [address] [place] [address], [place] [address]. All rights reserved. This information is not intended as a substitute for professional medical care. Always follow your healthcare professional's instructions.

## 2020-01-27 ENCOUNTER — OFFICE VISIT (OUTPATIENT)
Dept: FAMILY MEDICINE | Facility: CLINIC | Age: 50
End: 2020-01-27
Payer: COMMERCIAL

## 2020-01-27 VITALS
TEMPERATURE: 97.4 F | SYSTOLIC BLOOD PRESSURE: 120 MMHG | OXYGEN SATURATION: 99 % | WEIGHT: 136.4 LBS | HEART RATE: 99 BPM | DIASTOLIC BLOOD PRESSURE: 70 MMHG | RESPIRATION RATE: 16 BRPM | BODY MASS INDEX: 24.16 KG/M2

## 2020-01-27 DIAGNOSIS — R31.29 MICROHEMATURIA: ICD-10-CM

## 2020-01-27 DIAGNOSIS — R10.9 FLANK PAIN: Primary | ICD-10-CM

## 2020-01-27 LAB
ALBUMIN UR-MCNC: NEGATIVE MG/DL
APPEARANCE UR: CLEAR
BILIRUB UR QL STRIP: NEGATIVE
COLOR UR AUTO: ABNORMAL
GLUCOSE UR STRIP-MCNC: NEGATIVE MG/DL
HGB UR QL STRIP: ABNORMAL
KETONES UR STRIP-MCNC: NEGATIVE MG/DL
LEUKOCYTE ESTERASE UR QL STRIP: NEGATIVE
NITRATE UR QL: NEGATIVE
PH UR STRIP: 7 PH (ref 5–7)
RBC #/AREA URNS AUTO: 1 /HPF (ref 0–2)
SOURCE: ABNORMAL
SP GR UR STRIP: 1 (ref 1–1.03)
UROBILINOGEN UR STRIP-MCNC: 0 MG/DL (ref 0–2)
WBC #/AREA URNS AUTO: 0 /HPF (ref 0–5)

## 2020-01-27 PROCEDURE — 81001 URINALYSIS AUTO W/SCOPE: CPT | Performed by: FAMILY MEDICINE

## 2020-01-27 PROCEDURE — 99213 OFFICE O/P EST LOW 20 MIN: CPT | Performed by: FAMILY MEDICINE

## 2020-01-27 ASSESSMENT — PAIN SCALES - GENERAL: PAINLEVEL: NO PAIN (0)

## 2020-01-27 NOTE — PROGRESS NOTES
"Subjective     Julia Flynn is a 50 year old female who presents to clinic today for the following health issues:    HPI   URINARY TRACT SYMPTOMS  Onset: 1 week     Description:   Painful urination (Dysuria): no            Frequency: YES  Blood in urine (Hematuria): no   Delay in urine (Hesitency): YES    Intensity: moderate    Progression of Symptoms:  same    Accompanying Signs & Symptoms:  Fever/chills: YES  Flank pain YES  Nausea and vomiting: no   Any vaginal symptoms: none  Abdominal/Pelvic Pain: YES- once pelvic pain    History:   History of frequent UTI's: no   History of kidney stones: no   Sexually Active: YES  Possibility of pregnancy: No    Precipitating factors:   n/a    Therapies Tried and outcome: n/a    Julia is here today for a week history of left flank and lower back pain and she is concerned of having a bladder infection.  Comes and goes in nature with no know triggering factor - no unusual activities or recent history of trauma.  Dull and achy pain.  Usually better with bowel movement.  No obvious hematuria.  No histories of kidney stone.  No dysuria, urine frequency or urgency.  No nausea, vomiting, diarrhea; \"little constipation - not new\".  Overall feeling slightly better today.  No abnormal vaginal discharge.  No chest pain or shortness of breath.  No fever or chill.  Otherwise feeling well, no other concern.      Current Outpatient Medications   Medication Sig Dispense Refill     cetirizine (ZYRTEC) 10 MG tablet Take 10 mg by mouth daily       Cholecalciferol (VITAMIN D PO) Reported on 2/13/2017       Cyanocobalamin (VITAMIN B12 PO) Reported on 2/13/2017       Multiple Vitamin (MULTI-VITAMIN) per tablet Take 1 tablet by mouth daily Reported on 2/13/2017       albuterol (PROAIR HFA/PROVENTIL HFA/VENTOLIN HFA) 108 (90 Base) MCG/ACT inhaler Inhale 2 puffs into the lungs every 6 hours as needed for shortness of breath / dyspnea or wheezing (Patient not taking: Reported on 1/27/2020) 1 " Inhaler 0     calcium carbonate 500 MG tablet Take 1 tablet by mouth 2 times daily Reported on 2/13/2017       Allergies   Allergen Reactions     Ciprofloxacin      Cefprozil Rash         Reviewed and updated as needed this visit by Provider         Review of Systems   ROS COMP: Constitutional, HEENT, cardiovascular, pulmonary, gi and gu systems are negative, except as otherwise noted.      Objective    /70   Pulse 99   Temp 97.4  F (36.3  C) (Temporal)   Resp 16   Wt 61.9 kg (136 lb 6.4 oz)   LMP 08/02/2013   SpO2 99%   BMI 24.16 kg/m    Body mass index is 24.16 kg/m .  Physical Exam   GENERAL: healthy, alert and no distress  RESP: lungs clear to auscultation - no rales, rhonchi or wheezes  CV: regular rate and rhythm, no murmur  ABDOMEN: soft, nontender, nondistended, normal bowel sounds.  No CVA tenderness.  No tender palpation to the flank or suprapelvic area.  The pain was not reproducible with palpation today.  MS: no gross musculoskeletal defects noted, no edema.  Hips and lower back exams were normal.    Diagnostic Test Results:  Labs reviewed in Epic  Results for orders placed or performed in visit on 01/27/20   *UA reflex to Microscopic and Culture ([place]; [facility]; [facility]; [facility]; [facility]; [facility]; [place]; [place])     Status: Abnormal   Result Value Ref Range    Color Urine Straw     Appearance Urine Clear     Glucose Urine Negative NEG^Negative mg/dL    Bilirubin Urine Negative NEG^Negative    Ketones Urine Negative NEG^Negative mg/dL    Specific Gravity Urine 1.004 1.003 - 1.035    Blood Urine Small (A) NEG^Negative    pH Urine 7.0 5.0 - 7.0 pH    Protein Albumin Urine Negative NEG^Negative mg/dL    Urobilinogen mg/dL 0.0 0.0 - 2.0 mg/dL    Nitrite Urine Negative NEG^Negative    Leukocyte Esterase Urine Negative NEG^Negative    Source Unspecified Urine     RBC Urine 1 0 - 2 /HPF    WBC Urine 0 0 - 5 /HPF           Assessment & Plan       ICD-10-CM     1. Flank pain R10.9 *UA reflex to Microscopic and Culture ([place]; [facility]; [facility]; [facility]; [facility]; [facility]; [place]; [place])   2. Microhematuria R31.29      Julia is here today for a week history of the left flank and lower back pain that comes and goes.  The pain improved with defecation.  It is overall getting better.  Physical exam was normal.  No symptoms of UTI and UA showed trace microhematuria otherwise negative.  Unlikely that she has UTI and she was informed.  Concerned of kidney stone but has not ruled out skeletal muscle.   Option of further work-up discussed - including CT scan.   Also dicussed about the option of monitoring.  She did not look acutely sick.  She is interested to monitor for now.  Over-the-counter medication for symptomatic treatment.  Encouraged to increase fiber and water intake to prevent constipation.  Call in or follow-up with her primary care provider if the symptoms recur, persist or get worse.      She was recommended to follow-up with her primary care provider for preventive care and to discuss about colon cancer screening.  Recommended Tdap today but she declined.  Risks and benefits discussed and she is willing to take the risks.    Return in about 3 months (around 4/27/2020) for Physical Exam.    Nilda Lockwood Mai, MD  [facility]

## 2020-11-29 ENCOUNTER — HEALTH MAINTENANCE LETTER (OUTPATIENT)
Age: 50
End: 2020-11-29

## 2020-12-11 DIAGNOSIS — Z20.822 SUSPECTED COVID-19 VIRUS INFECTION: ICD-10-CM

## 2020-12-11 PROCEDURE — 36415 COLL VENOUS BLD VENIPUNCTURE: CPT | Performed by: FAMILY MEDICINE

## 2020-12-11 PROCEDURE — 86769 SARS-COV-2 COVID-19 ANTIBODY: CPT | Performed by: FAMILY MEDICINE

## 2020-12-12 LAB
COVID-19 SPIKE RBD ABY TITER: NORMAL
COVID-19 SPIKE RBD ABY: POSITIVE

## 2020-12-14 ENCOUNTER — TELEPHONE (OUTPATIENT)
Dept: OBGYN | Facility: CLINIC | Age: 50
End: 2020-12-14

## 2020-12-14 ENCOUNTER — OFFICE VISIT (OUTPATIENT)
Dept: OBGYN | Facility: CLINIC | Age: 50
End: 2020-12-14
Payer: COMMERCIAL

## 2020-12-14 VITALS
DIASTOLIC BLOOD PRESSURE: 60 MMHG | SYSTOLIC BLOOD PRESSURE: 100 MMHG | HEART RATE: 88 BPM | WEIGHT: 134.5 LBS | OXYGEN SATURATION: 98 % | BODY MASS INDEX: 23.83 KG/M2 | TEMPERATURE: 98.4 F

## 2020-12-14 DIAGNOSIS — Z01.419 WELL WOMAN EXAM WITH ROUTINE GYNECOLOGICAL EXAM: Primary | ICD-10-CM

## 2020-12-14 DIAGNOSIS — Z12.11 SPECIAL SCREENING FOR MALIGNANT NEOPLASMS, COLON: ICD-10-CM

## 2020-12-14 DIAGNOSIS — Z12.31 ENCOUNTER FOR SCREENING MAMMOGRAM FOR BREAST CANCER: ICD-10-CM

## 2020-12-14 PROCEDURE — 99396 PREV VISIT EST AGE 40-64: CPT | Performed by: OBSTETRICS & GYNECOLOGY

## 2020-12-14 NOTE — TELEPHONE ENCOUNTER
Left message for patient. If she returns call, please help her reschedule physical that is scheduled for today with Dr. Fields. Pt is Covid positive. Per Dr. Fields we can still see her though if she is having an acute issue that needs to be addressed. Thanks    Martina Gutierrez MA

## 2020-12-14 NOTE — PROGRESS NOTES
SUBJECTIVE:       HPI: Julia Flynn is a 50 year old  who presents today for well woman exam. No concerns today.    Recent COVID antibody testing completed, patient asymptomatic. Concerned about possible COVID infection in October, reports symptoms similar to a cold. No PCR testing previously completed.    She denies vaginal discharge, pmb, dyspareunia. She denies fevers/chills, nausea/vomiting, abdominal pain or bloating. Denies dysuria, hematuria, constipation or diarrhea.      Ob Hx:  s/p SVDx3.      Gyn Hx: Patient's last menstrual period was 2013.  Menopause at 44yo  Patient is sexually active. One male partner   Last pap was 2017 NIL, neg HPV, No history of abnormal paps. Next pap due 2022   STI history denies   STD testing offered?  Declined  Last Mammogram - 3/2019 birads 1  Colon Screening - none  Family history of gyn-related malignancies: denies         reports that she has never smoked. She has never used smokeless tobacco.      Today's PHQ-2 Score:   PHQ-2 (  [employer]) 3/6/2019   Q1: Little interest or pleasure in doing things 0   Q2: Feeling down, depressed or hopeless 0   PHQ-2 Score 0   Q1: Little interest or pleasure in doing things Not at all   Q2: Feeling down, depressed or hopeless Not at all   PHQ-2 Score 0     Today's PHQ-9 Score:   PHQ-9 SCORE 2017   PHQ-9 Total Score MyChart 0   PHQ-9 Total Score 0     Today's TIANA-7 Score: No flowsheet data found.    Problem list and histories reviewed & adjusted, as indicated.  Additional history: as documented.    Patient Active Problem List   Diagnosis     Seasonal allergic rhinitis     CARDIOVASCULAR SCREENING; LDL GOAL LESS THAN 130     Screening for diabetes mellitus     Eczema     Past Surgical History:   Procedure Laterality Date     C ORAL SURGERY PROCEDURE        Social History     Tobacco Use     Smoking status: Never Smoker     Smokeless tobacco: Never Used   Substance Use Topics     Alcohol use: No                 calcium carbonate 500 MG tablet, Take 1 tablet by mouth 2 times daily Reported on 2017       cetirizine (ZYRTEC) 10 MG tablet, Take 10 mg by mouth daily       Cholecalciferol (VITAMIN D PO), Reported on 2017       CRANBERRY PO,        Multiple Vitamin (MULTI-VITAMIN) per tablet, Take 1 tablet by mouth daily Reported on 2017       albuterol (PROAIR HFA/PROVENTIL HFA/VENTOLIN HFA) 108 (90 Base) MCG/ACT inhaler, Inhale 2 puffs into the lungs every 6 hours as needed for shortness of breath / dyspnea or wheezing (Patient not taking: Reported on 2020)       Cyanocobalamin (VITAMIN B12 PO), Reported on 2017    No current facility-administered medications on file prior to visit.     Allergies   Allergen Reactions     Ciprofloxacin      Cefprozil Rash       ROS:  10 Point review of systems negative other noted above in HPI    OBJECTIVE:     /60 (BP Location: Right arm, Patient Position: Chair, Cuff Size: Adult Regular)   Pulse 88   Temp 98.4  F (36.9  C) (Temporal)   Wt 61 kg (134 lb 8 oz)   LMP 2013   SpO2 98%   BMI 23.83 kg/m    Body mass index is 23.83 kg/m .    Gen: Alert, oriented, appropriately interactive, NAD  Neck: soft, no cervical adenopathy, no masses  CV: RRR, no murmurs, no extra heart sounds, 2+ peripheral pulses  Resp: CTAB, good effort without distress   Breasts: no axillary adenopathy, no dominant masses, no skin changes, no nipple discharge, nontender  Abdomen: soft, non tender, non distended, no masses, no hernias. No inguinal lymphadenopathy.   Pelvic: Deferred through mutual decision making  MSK: normal gait, symmetric movements UE & LE  Lower extremities: non-tender, no edema    In-Clinic Test Results:  No results found for this or any previous visit (from the past 24 hour(s)).    ASSESSMENT/PLAN:                                                      Julia Flynn is a 50 year old  who presents today for well woman exam      ICD-10-CM    1.  Well woman exam with routine gynecological exam  Z01.419 Hepatitis C antibody     HIV Antigen Antibody Combo   2. Encounter for screening mammogram for breast cancer  Z12.31 *MA Screening Digital Bilateral   3. Special screening for malignant neoplasms, colon  Z12.11 GASTROENTEROLOGY ADULT REF PROCEDURE ONLY       WWE.   Pap w/ cotesting due 12/2022 per ASCCP guidelines.   Screening mammogram ordered  Screening colonoscopy referral placed  Flu vaccine - Has already completed- Oct 2020  HIV, Hepatitis Screening- Orders placed, patient unsure about testing   Dtap, Zoster vaccine encouraged    Rebekah Fields DO  [facility]

## 2020-12-16 ENCOUNTER — TELEPHONE (OUTPATIENT)
Facility: CLINIC | Age: 50
End: 2020-12-16

## 2020-12-16 NOTE — LETTER
[facility]  [facility] [id number]  [phone]        December 23, 2020    Julia Flynn  [address] [place] [id number]

## 2020-12-16 NOTE — LETTER
[facility] [place] [address]  [phone]      December 18, 2020      Julia Flynn  [address] [place] [id number]      Dear Julia:     To better serve you, we are sending this letter to notify you that we have attempted to contact you by telephone to schedule the following procedure(s) ordered by your physician.     ___x____   Colonoscopy   _______   Upper GI Endoscopy (EGD)   _______   Colonoscopy and Upper GI Endoscopy    To provide the highest quality of care, we strongly encourage you to call and schedule the prescribed test/procedure at your earliest convenience.   The number to the Specialty Scheduling department is [phone] and the hours are 8:00am - 4:30pm Monday through Friday.   We look forward to hearing from you.    Sincerely,    [facility]

## 2020-12-16 NOTE — TELEPHONE ENCOUNTER
Left message for patient to return call to schedule colonoscopy or EGD. If Mary or Chetna are unavailable, please transfer to the surgery center.

## 2020-12-16 NOTE — LETTER

## 2020-12-18 NOTE — TELEPHONE ENCOUNTER
Left message for patient to return call to schedule EGD/colonoscopy. If Chetna or Mary are not available, please transfer to same day surgery        x2 letter sent

## 2020-12-23 NOTE — TELEPHONE ENCOUNTER
Date of colonoscopy/EGD: 1/15/21  Surgeon: Dr. Mays  Prep:Miralax  Packet:Colonoscopy/EGD instructions mailed to patient's home address.   Date: 12/23/2020      Surgery Scheduler

## 2020-12-30 DIAGNOSIS — Z11.59 ENCOUNTER FOR SCREENING FOR OTHER VIRAL DISEASES: Primary | ICD-10-CM

## 2021-01-05 ENCOUNTER — HOSPITAL ENCOUNTER (OUTPATIENT)
Dept: MAMMOGRAPHY | Facility: CLINIC | Age: 51
Discharge: HOME | End: 2021-01-05
Attending: OBSTETRICS & GYNECOLOGY | Admitting: OBSTETRICS & GYNECOLOGY
Payer: COMMERCIAL

## 2021-01-05 DIAGNOSIS — Z12.31 ENCOUNTER FOR SCREENING MAMMOGRAM FOR BREAST CANCER: ICD-10-CM

## 2021-01-05 PROCEDURE — 77067 SCR MAMMO BI INCL CAD: CPT

## 2021-01-11 DIAGNOSIS — Z11.59 ENCOUNTER FOR SCREENING FOR OTHER VIRAL DISEASES: ICD-10-CM

## 2021-01-11 PROCEDURE — U0003 INFECTIOUS AGENT DETECTION BY NUCLEIC ACID (DNA OR RNA); SEVERE ACUTE RESPIRATORY SYNDROME CORONAVIRUS 2 (SARS-COV-2) (CORONAVIRUS DISEASE [COVID-19]), AMPLIFIED PROBE TECHNIQUE, MAKING USE OF HIGH THROUGHPUT TECHNOLOGIES AS DESCRIBED BY CMS-2020-01-R: HCPCS | Performed by: SURGERY

## 2021-01-11 PROCEDURE — U0005 INFEC AGEN DETEC AMPLI PROBE: HCPCS | Performed by: SURGERY

## 2021-01-12 LAB
SARS-COV-2 RNA RESP QL NAA+PROBE: NOT DETECTED
SPECIMEN SOURCE: NORMAL

## 2021-01-15 ENCOUNTER — HOSPITAL ENCOUNTER (OUTPATIENT)
Facility: CLINIC | Age: 51
Discharge: HOME | End: 2021-01-15
Attending: SURGERY | Admitting: SURGERY
Payer: COMMERCIAL

## 2021-01-15 ENCOUNTER — ANESTHESIA (OUTPATIENT)
Dept: GASTROENTEROLOGY | Facility: CLINIC | Age: 51
End: 2021-01-15
Payer: COMMERCIAL

## 2021-01-15 ENCOUNTER — ANESTHESIA EVENT (OUTPATIENT)
Dept: GASTROENTEROLOGY | Facility: CLINIC | Age: 51
End: 2021-01-15
Payer: COMMERCIAL

## 2021-01-15 VITALS
HEART RATE: 72 BPM | DIASTOLIC BLOOD PRESSURE: 67 MMHG | BODY MASS INDEX: 23.74 KG/M2 | RESPIRATION RATE: 16 BRPM | SYSTOLIC BLOOD PRESSURE: 101 MMHG | HEIGHT: 63 IN | OXYGEN SATURATION: 96 % | TEMPERATURE: 98.2 F | WEIGHT: 134 LBS

## 2021-01-15 LAB — COLONOSCOPY: NORMAL

## 2021-01-15 PROCEDURE — 45378 DIAGNOSTIC COLONOSCOPY: CPT | Performed by: SURGERY

## 2021-01-15 PROCEDURE — 250N000011 HC RX IP 250 OP 636: Performed by: NURSE ANESTHETIST, CERTIFIED REGISTERED

## 2021-01-15 PROCEDURE — G0121 COLON CA SCRN NOT HI RSK IND: HCPCS | Performed by: SURGERY

## 2021-01-15 PROCEDURE — 250N000009 HC RX 250: Performed by: SURGERY

## 2021-01-15 PROCEDURE — 250N000009 HC RX 250: Performed by: NURSE ANESTHETIST, CERTIFIED REGISTERED

## 2021-01-15 PROCEDURE — 258N000003 HC RX IP 258 OP 636: Performed by: SURGERY

## 2021-01-15 PROCEDURE — 370N000017 HC ANESTHESIA TECHNICAL FEE, PER MIN: Performed by: SURGERY

## 2021-01-15 RX ORDER — LIDOCAINE 40 MG/G
CREAM TOPICAL
Status: DISCONTINUED | OUTPATIENT
Start: 2021-01-15 | End: 2021-01-15 | Stop reason: HOSPADM

## 2021-01-15 RX ORDER — PROPOFOL 10 MG/ML
INJECTION, EMULSION INTRAVENOUS PRN
Status: DISCONTINUED | OUTPATIENT
Start: 2021-01-15 | End: 2021-01-15

## 2021-01-15 RX ORDER — LIDOCAINE HYDROCHLORIDE 20 MG/ML
INJECTION, SOLUTION INFILTRATION; PERINEURAL PRN
Status: DISCONTINUED | OUTPATIENT
Start: 2021-01-15 | End: 2021-01-15

## 2021-01-15 RX ORDER — SODIUM CHLORIDE, SODIUM LACTATE, POTASSIUM CHLORIDE, CALCIUM CHLORIDE 600; 310; 30; 20 MG/100ML; MG/100ML; MG/100ML; MG/100ML
INJECTION, SOLUTION INTRAVENOUS CONTINUOUS
Status: DISCONTINUED | OUTPATIENT
Start: 2021-01-15 | End: 2021-01-15 | Stop reason: HOSPADM

## 2021-01-15 RX ORDER — PROPOFOL 10 MG/ML
INJECTION, EMULSION INTRAVENOUS CONTINUOUS PRN
Status: DISCONTINUED | OUTPATIENT
Start: 2021-01-15 | End: 2021-01-15

## 2021-01-15 RX ADMIN — PROPOFOL 30 MG: 10 INJECTION, EMULSION INTRAVENOUS at 08:41

## 2021-01-15 RX ADMIN — PROPOFOL 40 MG: 10 INJECTION, EMULSION INTRAVENOUS at 08:46

## 2021-01-15 RX ADMIN — LIDOCAINE HYDROCHLORIDE 1 ML: 10 INJECTION, SOLUTION EPIDURAL; INFILTRATION; INTRACAUDAL; PERINEURAL at 08:34

## 2021-01-15 RX ADMIN — PROPOFOL 50 MG: 10 INJECTION, EMULSION INTRAVENOUS at 08:40

## 2021-01-15 RX ADMIN — LIDOCAINE HYDROCHLORIDE 50 MG: 20 INJECTION, SOLUTION INFILTRATION; PERINEURAL at 08:40

## 2021-01-15 RX ADMIN — PROPOFOL 20 MG: 10 INJECTION, EMULSION INTRAVENOUS at 08:44

## 2021-01-15 RX ADMIN — SODIUM CHLORIDE, POTASSIUM CHLORIDE, SODIUM LACTATE AND CALCIUM CHLORIDE: 600; 310; 30; 20 INJECTION, SOLUTION INTRAVENOUS at 08:35

## 2021-01-15 RX ADMIN — PROPOFOL 200 MCG/KG/MIN: 10 INJECTION, EMULSION INTRAVENOUS at 08:40

## 2021-01-15 RX ADMIN — PROPOFOL 20 MG: 10 INJECTION, EMULSION INTRAVENOUS at 08:47

## 2021-01-15 SDOH — HEALTH STABILITY: MENTAL HEALTH: CURRENT SMOKER: 0

## 2021-01-15 ASSESSMENT — MIFFLIN-ST. JEOR: SCORE: 1196.95

## 2021-01-15 NOTE — H&P
[facility]    Pre-Endoscopy History and Physical     Julia Flynn MRN# [id number]   YOB: 1970 Age: 50 year old     Date of Procedure: 1/15/2021  Primary care provider: No Ref-Primary, Physician  Type of Endoscopy: Colonoscopy with possible biopsy, possible polypectomy  Reason for Procedure: screening  Type of Anesthesia Anticipated: MAC    HPI:    Julia is a 50 year old female who will be undergoing the above procedure.  1st colonoscopy; no famhx; no meds    A history and physical has been performed. The patient's medications and allergies have been reviewed. The risks and benefits of the procedure and the sedation options and risks were discussed with the patient.  All questions were answered and informed consent was obtained.      She denies a personal or family history of anesthesia complications or bleeding disorders.     Patient Active Problem List   Diagnosis     Seasonal allergic rhinitis     CARDIOVASCULAR SCREENING; LDL GOAL LESS THAN 130     Screening for diabetes mellitus     Eczema        No past medical history on file.     Past Surgical History:   Procedure Laterality Date     C ORAL SURGERY PROCEDURE         Social History     Tobacco Use     Smoking status: Never Smoker     Smokeless tobacco: Never Used   Substance Use Topics     Alcohol use: No       History reviewed. No pertinent family history.    Prior to Admission medications    Medication Sig Start Date End Date Taking? Authorizing Provider   calcium carbonate 500 MG tablet Take 1 tablet by mouth 2 times daily Reported on 2/13/2017   Yes Reported, Patient   cetirizine (ZYRTEC) 10 MG tablet Take 10 mg by mouth daily   Yes Reported, Patient   Cholecalciferol (VITAMIN D PO) Reported on 2/13/2017   Yes Reported, Patient   CRANBERRY PO    Yes Reported, Patient   Cyanocobalamin (VITAMIN B12 PO) Reported on 2/13/2017   Yes Reported, Patient   Multiple Vitamin (MULTI-VITAMIN) per tablet Take 1 tablet by mouth  "daily Reported on 2/13/2017   Yes Reported, Patient   albuterol (PROAIR HFA/PROVENTIL HFA/VENTOLIN HFA) 108 (90 Base) MCG/ACT inhaler Inhale 2 puffs into the lungs every 6 hours as needed for shortness of breath / dyspnea or wheezing  Patient not taking: Reported on 1/27/2020 1/22/19   Lizette Jeffers, MARIANNE CNP       Allergies   Allergen Reactions     Ciprofloxacin      Mom had reaction and warned her to note this on her chart.  Had unresolved numbness.     Cefprozil Rash        REVIEW OF SYSTEMS:   5 point ROS negative except as noted above in HPI, including Gen., Resp., CV, GI &  system review.    PHYSICAL EXAM:   [facility] 08/02/2013  Estimated body mass index is 23.83 kg/m  as calculated from the following:    Height as of 8/26/19: 1.6 m (5' 3\").    Weight as of 12/14/20: 61 kg (134 lb 8 oz).   Constitutional: Awake, alert, no acute distress.  Eyes: No scleral icterus.  Conjunctiva are without injection.  ENMT: Mucous membranes moist, dentition and gums are intact.   Neck: Soft, supple, trachea midline.    Endocrine: n/a   Lymphatic: There is no cervical, submandibularadenopathy.  Respiratory: Lungs are clear to auscultation and percussion bilaterally.   Cardiovascular: Regular rate and rhythm. No murmurs, rubs, or gallops.    Abdomen: Non-distended, non-tender, normoactive bowel sounds present, No masses,  Musculoskeletal: No spinal or CVA tenderness. Full range of motion in the upper and lower extremities.    Skin: No skin rashes or lesions to inspection.  No petechia.    Neurologic: Cranial nerves II through XII are grossly intact and symmetric.  Psychiatric: The patient is alert and oriented times 3.  The patient's affect is not blunted and mood is appropriate.  DIAGNOSTICS:    Not indicated    IMPRESSION   ASA Class 2 - Mild systemic disease    PLAN:   Plan for Colonoscopy with possible biopsy, possible polypectomy. We discussed the risks, benefits and alternatives and the patient wished to proceed.  Patient " is cleared for the above procedure.    The above has been forwarded to the consulting provider.    [facility], [facility]

## 2021-01-15 NOTE — ANESTHESIA CARE TRANSFER NOTE
Patient: Julia Flynn    Procedure(s):  COLONOSCOPY    Diagnosis: Special screening for malignant neoplasms, colon [Z12.11]  Diagnosis Additional Information: No value filed.    Anesthesia Type:   General     Note:  Airway :Face Mask  Patient transferred to:Phase II  Handoff Report: Identifed the Patient, Identified the Reponsible Provider, Reviewed the pertinent medical history, Discussed the surgical course, Reviewed Intra-OP anesthesia mangement and issues during anesthesia, Set expectations for post-procedure period and Allowed opportunity for questions and acknowledgement of understanding      Vitals: (Last set prior to Anesthesia Care Transfer)    CRNA VITALS  1/15/2021 0829 - 1/15/2021 0908      1/15/2021             Pulse:  74    SpO2:  98 %    Resp Rate (observed):  (!) 5                Electronically Signed By: MARIANNE Choudhury CRNA  January 15, 2021  9:08 AM

## 2021-01-15 NOTE — DISCHARGE INSTRUCTIONS
[facility]    Home Care Following Endoscopy          Activity:    You have just undergone an endoscopic procedure usually performed with conscious sedation.  Do not work or operate machinery (including a car) for at least 12 hours.      I encourage you to walk and attempt to pass this air as soon as possible.    Diet:    Return to the diet you were on before your procedure but eat lightly for the first 12-24 hours.    Drink plenty of water.    Resume any regular medications unless otherwise advised by your physician.  Please begin any new medication prescribed as a result of your procedure as directed by your physician.     If you had any biopsy or polyp removed please refrain from aspirin or aspirin products for 2 days.  If on Coumadin please restart as instructed by your physician.   Pain:    You may take Tylenol as needed for pain.  Expected Recovery:    You can expect some mild abdominal fullness and/or discomfort due to the air used to inflate your intestinal tract..    Call Your Physician if You Have:      After Colonoscopy:  o Worsening persisting abdominal pain which is worse with activity.  o Fevers (>101 degrees F), chills or shakes.  o Passage of continued blood with bowel movements.   Any questions or concerns about your recovery, please call [phone] or after hours [phone] Nurse Advice Line.    Follow-up Care:    You should receive a call or letter with your results within 1 week. Please call if you have not received a notification of your results.  If asked to return to clinic please make an appointment 1 week after your procedure.  Call [phone].

## 2021-01-15 NOTE — ANESTHESIA POSTPROCEDURE EVALUATION
Patient: Julia Flynn    Procedure(s):  COLONOSCOPY    Diagnosis:Special screening for malignant neoplasms, colon [Z12.11]  Diagnosis Additional Information: No value filed.    Anesthesia Type:  General    Note:  Anesthesia Post Evaluation    Patient location during evaluation: Phase 2  Patient participation: Able to fully participate in evaluation  Level of consciousness: awake and alert  Pain management: adequate  Airway patency: patent  Cardiovascular status: acceptable and stable  Respiratory status: acceptable and room air  Hydration status: acceptable  PONV: none     Anesthetic complications: None    Comments:  Patient was happy with the anesthesia care received and no anesthesia related complications were noted.  I will follow up with the patient again if it is needed.        Last vitals:  Vitals:    01/15/21 0815 01/15/21 0905 01/15/21 0910   BP: 115/70 100/62 101/64   Resp: 16 16 16   Temp: 98.2  F (36.8  C)     SpO2: 99% 99% 96%         Electronically Signed By: MARIANNE Choudhury CRNA  January 15, 2021  9:23 AM

## 2021-01-15 NOTE — ANESTHESIA PREPROCEDURE EVALUATION
"Anesthesia Pre-Procedure Evaluation    Patient: Julia Flynn   MRN: [id number] : 1970          Preoperative Diagnosis: Special screening for malignant neoplasms, colon [Z12.11]    Procedure(s):  COLONOSCOPY    No past medical history on file.  Past Surgical History:   Procedure Laterality Date     C ORAL SURGERY PROCEDURE         Anesthesia Evaluation     . Pt has had prior anesthetic. Type: MAC    No history of anesthetic complications          ROS/MED HX    ENT/Pulmonary:     (+)allergic rhinitis, , . .    Neurologic:  - neg neurologic ROS     Cardiovascular:     (+) Dyslipidemia, ----. : . . . :. . No previous cardiac testing       METS/Exercise Tolerance:  >4 METS   Hematologic:  - neg hematologic  ROS       Musculoskeletal:  - neg musculoskeletal ROS       GI/Hepatic:  - neg GI/hepatic ROS       Renal/Genitourinary:  - ROS Renal section negative       Endo:  - neg endo ROS       Psychiatric:  - neg psychiatric ROS       Infectious Disease:  - neg infectious disease ROS       Malignancy:      - no malignancy   Other:    - neg other ROS                      Physical Exam  Normal systems: cardiovascular, pulmonary and dental    Airway   Mallampati: II  TM distance: >3 FB  Neck ROM: full    Dental     Cardiovascular   Rhythm and rate: regular and normal      Pulmonary             Lab Results   Component Value Date    GLC 91 2019    TSH 2.66 2016       Preop Vitals  BP Readings from Last 3 Encounters:   20 100/60   20 120/70   20 104/68    Pulse Readings from Last 3 Encounters:   20 88   20 99   20 100      Resp Readings from Last 3 Encounters:   20 16   20 18   10/08/19 16    SpO2 Readings from Last 3 Encounters:   20 98%   20 99%   20 97%      Temp Readings from Last 1 Encounters:   20 98.4  F (36.9  C) (Temporal)    Ht Readings from Last 1 Encounters:   19 1.6 m (5' 3\")      Wt Readings from Last 1 Encounters: " "  12/14/20 61 kg (134 lb 8 oz)    Estimated body mass index is 23.83 kg/m  as calculated from the following:    Height as of 8/26/19: 1.6 m (5' 3\").    Weight as of 12/14/20: 61 kg (134 lb 8 oz).       Anesthesia Plan      History & Physical Review  History and physical reviewed and following examination; no interval change.Dr. Mays to complete pre op H&P on DOS    ASA Status:  1 .    NPO Status:  > 8 hours    Plan for General with Intravenous and Propofol induction. Maintenance will be TIVA.        The patient is not a current smoker  and patient did not smoke on day of surgery     Postoperative Care      Consents  Anesthetic plan, risks, benefits and alternatives discussed with:  Patient.  Use of blood products discussed: No .   .                 MARIANNE Choudhury CRNA  "

## 2021-09-25 ENCOUNTER — HEALTH MAINTENANCE LETTER (OUTPATIENT)
Age: 51
End: 2021-09-25

## 2022-01-15 ENCOUNTER — HEALTH MAINTENANCE LETTER (OUTPATIENT)
Age: 52
End: 2022-01-15

## 2022-03-12 ENCOUNTER — HEALTH MAINTENANCE LETTER (OUTPATIENT)
Age: 52
End: 2022-03-12

## 2022-03-15 ENCOUNTER — HOSPITAL ENCOUNTER (OUTPATIENT)
Dept: MAMMOGRAPHY | Facility: CLINIC | Age: 52
Discharge: HOME | End: 2022-03-15
Attending: OBSTETRICS & GYNECOLOGY | Admitting: OBSTETRICS & GYNECOLOGY
Payer: COMMERCIAL

## 2022-03-15 DIAGNOSIS — Z12.31 VISIT FOR SCREENING MAMMOGRAM: ICD-10-CM

## 2022-03-15 PROCEDURE — 77067 SCR MAMMO BI INCL CAD: CPT

## 2022-12-26 ENCOUNTER — HEALTH MAINTENANCE LETTER (OUTPATIENT)
Age: 52
End: 2022-12-26

## 2023-03-08 ENCOUNTER — TELEPHONE (OUTPATIENT)
Dept: FAMILY MEDICINE | Facility: CLINIC | Age: 53
End: 2023-03-08
Payer: COMMERCIAL

## 2023-03-08 NOTE — TELEPHONE ENCOUNTER
Patient states that she has had a URI and continues to have symptoms 2 weeks later.    She would like to have her lungs checked to make sure that there isn't something more happening.    Patient has not been seen within the last 2 years.  Recommended to patient that she go to .    Patient would like to establish care with a provider here in .  Patient was transferred to [facility] to get this set up.

## 2023-03-10 ENCOUNTER — OFFICE VISIT (OUTPATIENT)
Dept: INTERNAL MEDICINE | Facility: CLINIC | Age: 53
End: 2023-03-10
Payer: COMMERCIAL

## 2023-03-10 VITALS
SYSTOLIC BLOOD PRESSURE: 118 MMHG | WEIGHT: 138.8 LBS | HEIGHT: 63 IN | TEMPERATURE: 97.6 F | HEART RATE: 88 BPM | RESPIRATION RATE: 16 BRPM | OXYGEN SATURATION: 99 % | BODY MASS INDEX: 24.59 KG/M2 | DIASTOLIC BLOOD PRESSURE: 64 MMHG

## 2023-03-10 DIAGNOSIS — R05.2 SUBACUTE COUGH: ICD-10-CM

## 2023-03-10 DIAGNOSIS — R07.81 RIB PAIN ON LEFT SIDE: Primary | ICD-10-CM

## 2023-03-10 DIAGNOSIS — Z12.31 VISIT FOR SCREENING MAMMOGRAM: ICD-10-CM

## 2023-03-10 PROCEDURE — 99203 OFFICE O/P NEW LOW 30 MIN: CPT | Performed by: INTERNAL MEDICINE

## 2023-03-10 ASSESSMENT — PAIN SCALES - GENERAL: PAINLEVEL: SEVERE PAIN (7)

## 2023-03-10 NOTE — PROGRESS NOTES
"  Assessment & Plan   Problem List Items Addressed This Visit    None  Visit Diagnoses     Rib pain on left side    -  Primary    Visit for screening mammogram        Relevant Orders    MA SCREENING DIGITAL BILAT - Future  (s+30)    Subacute cough             Patient had URI with cough 2 and half weeks ago.  Still is little to cough clear phlegm getting better.  Has some rib pain on the left side taking ibuprofen 400 mg every 4 hours I would continue this and add a topical like Voltaren.  No reason for x-ray as this should improve over time.  I did offer steroids but will hold off until next week if she is not improving.             No follow-ups on file.    Hossein Michael MD  [facility]    Bhupinder Dumont is a 53 year old, presenting for the following health issues:  Cold Symptoms and Establish Care    History of Present Illness       Reason for visit:  Establish care/listen to lungs, muscle pain    She eats 4 or more servings of fruits and vegetables daily.She consumes 0 sweetened beverage(s) daily.She exercises with enough effort to increase her heart rate 30 to 60 minutes per day.  She exercises with enough effort to increase her heart rate 6 days per week.   She is taking medications regularly.     URI earlier this year, 2.5 weeks ago. Didn't test for covid.  Had previous covid.  Feels colds always land in her chest.     Has an albuterol inhaler that helps, cough is better now, was hard. Left rib is painful. Taking Advil every 4 hours-quite painful.  Clear phlegm.  Using mucinex daily bid.         Review of Systems         Objective    /64 (BP Location: Right arm, Patient Position: Chair)   Pulse 88   Temp 97.6  F (36.4  C) (Temporal)   Resp 16   Ht 1.6 m (5' 3\")   Wt 63 kg (138 lb 12.8 oz)   LMP 08/02/2013   SpO2 99%   BMI 24.59 kg/m    Body mass index is 24.59 kg/m .  Physical Exam   Acute distress  Heart is regular  Lungs are completely clear without any wheezing or " crackles.  Left ribs have a little bit of tenderness on the lower left rib medial in the sternum but otherwise not severe

## 2023-03-15 ENCOUNTER — MYC MEDICAL ADVICE (OUTPATIENT)
Dept: INTERNAL MEDICINE | Facility: CLINIC | Age: 53
End: 2023-03-15
Payer: COMMERCIAL

## 2023-03-15 DIAGNOSIS — R07.81 RIB PAIN ON LEFT SIDE: ICD-10-CM

## 2023-03-15 DIAGNOSIS — R05.2 SUBACUTE COUGH: Primary | ICD-10-CM

## 2023-03-17 NOTE — TELEPHONE ENCOUNTER
Julia called regarding this message, she had added to previously.    She said that she is 90% positive it is pleurisy as you thought.  Pain still located 'under her boobs'.  Cough is getting better though still kicking out yellow phlegm here and there.  Any thing further she could do... or just continue the ibuprofen for the pain every 4-5 hours?    Sally Christianson XRO/

## 2023-04-05 ENCOUNTER — HOSPITAL ENCOUNTER (OUTPATIENT)
Dept: CT IMAGING | Facility: CLINIC | Age: 53
Discharge: HOME | End: 2023-04-05
Attending: INTERNAL MEDICINE | Admitting: INTERNAL MEDICINE
Payer: COMMERCIAL

## 2023-04-05 DIAGNOSIS — R05.2 SUBACUTE COUGH: ICD-10-CM

## 2023-04-05 DIAGNOSIS — R07.81 RIB PAIN ON LEFT SIDE: ICD-10-CM

## 2023-04-05 PROCEDURE — 71250 CT THORAX DX C-: CPT

## 2023-04-16 ENCOUNTER — HEALTH MAINTENANCE LETTER (OUTPATIENT)
Age: 53
End: 2023-04-16

## 2023-06-02 ENCOUNTER — HEALTH MAINTENANCE LETTER (OUTPATIENT)
Age: 53
End: 2023-06-02

## 2023-08-02 ENCOUNTER — HOSPITAL ENCOUNTER (OUTPATIENT)
Dept: MAMMOGRAPHY | Facility: CLINIC | Age: 53
Discharge: HOME | End: 2023-08-02
Attending: INTERNAL MEDICINE | Admitting: INTERNAL MEDICINE
Payer: COMMERCIAL

## 2023-08-02 DIAGNOSIS — Z12.31 VISIT FOR SCREENING MAMMOGRAM: ICD-10-CM

## 2023-08-02 PROCEDURE — 77067 SCR MAMMO BI INCL CAD: CPT

## 2023-08-03 ENCOUNTER — ANCILLARY ORDERS (OUTPATIENT)
Dept: INTERNAL MEDICINE | Facility: CLINIC | Age: 53
End: 2023-08-03

## 2023-08-03 DIAGNOSIS — R92.8 ABNORMAL MAMMOGRAM: Primary | ICD-10-CM

## 2023-08-04 ENCOUNTER — HOSPITAL ENCOUNTER (OUTPATIENT)
Dept: MAMMOGRAPHY | Facility: CLINIC | Age: 53
Discharge: HOME | End: 2023-08-04
Attending: INTERNAL MEDICINE
Payer: COMMERCIAL

## 2023-08-04 ENCOUNTER — ANCILLARY ORDERS (OUTPATIENT)
Dept: INTERNAL MEDICINE | Facility: CLINIC | Age: 53
End: 2023-08-04

## 2023-08-04 DIAGNOSIS — R92.8 ABNORMAL MAMMOGRAM: ICD-10-CM

## 2023-08-04 DIAGNOSIS — R92.8 ABNORMAL MAMMOGRAM: Primary | ICD-10-CM

## 2023-08-04 PROCEDURE — 77061 BREAST TOMOSYNTHESIS UNI: CPT | Mod: RT

## 2023-08-24 NOTE — PATIENT INSTRUCTIONS
If you have any questions regarding your visit, Please contact your care team.    [facility]: [phone]      [facility] CLINIC HOURS TELEPHONE NUMBER   Rebekah Fields DO.    DO Lees-Surgery Scheduler  Shelly - Surgery Scheduler    KEVIN Rose, KEVIN Perea RN     Monday, Thursday  [place]  7am-3pm    Tuesday, Wednesday  [place]  7am-3pm    E/O Friday &   [place]    Typical Surgery Days: Thursday or Friday   [facility]  [address] [place] [address] [phone] Phone  [phone] Fax    [address] [address] [phone] Phone    Imaging [phone] Phone    [facility] Labor and Delivery:  [phone] Phone     **Surgeries** Our Surgery Schedulers will contact you to schedule. If you do not receive a call within 3 business days, please call [phone].    Urgent Care locations:  [facility] Saturday and    9 am - 5 pm    Monday-Friday   12 pm - 8 pm  Saturday and    9 am - 5 pm   [phone]       If you need a medication refill, please contact your pharmacy. Please allow 3 business days for your refill to be completed.  As always, Thank you for trusting us with your healthcare needs!

## 2023-08-29 ENCOUNTER — OFFICE VISIT (OUTPATIENT)
Dept: OBGYN | Facility: CLINIC | Age: 53
End: 2023-08-29
Payer: COMMERCIAL

## 2023-08-29 VITALS
HEART RATE: 86 BPM | DIASTOLIC BLOOD PRESSURE: 78 MMHG | BODY MASS INDEX: 23.85 KG/M2 | WEIGHT: 134.6 LBS | SYSTOLIC BLOOD PRESSURE: 122 MMHG | HEIGHT: 63 IN

## 2023-08-29 DIAGNOSIS — Z01.419 WELL WOMAN EXAM WITH ROUTINE GYNECOLOGICAL EXAM: Primary | ICD-10-CM

## 2023-08-29 DIAGNOSIS — Z12.4 CERVICAL CANCER SCREENING: ICD-10-CM

## 2023-08-29 PROCEDURE — 87624 HPV HI-RISK TYP POOLED RSLT: CPT | Performed by: OBSTETRICS & GYNECOLOGY

## 2023-08-29 PROCEDURE — G0145 SCR C/V CYTO,THINLAYER,RESCR: HCPCS | Performed by: OBSTETRICS & GYNECOLOGY

## 2023-08-29 PROCEDURE — 99396 PREV VISIT EST AGE 40-64: CPT | Performed by: OBSTETRICS & GYNECOLOGY

## 2023-08-29 NOTE — PROGRESS NOTES
SUBJECTIVE:       HPI: Julia Flynn is a 53 year old  who presents today for WWE.     Ob Hx:  s/p   OB History    Para Term  AB Living   3 3 3 0 0 3   SAB IAB Ectopic Multiple Live Births   0 0 0 0 3      # Outcome Date GA Lbr Terrell/2nd Weight Sex Delivery Anes PTL Lv   3 Term         PHIL   2 Term         PHIL   1 Term         PHIL    .      Gyn Hx: Patient's last menstrual period was 2013.    Patient is sexually active. One male partner   Last pap was   Lab Results   Component Value Date    PAP NIL neg hpv 2017      STI history - none  STD testing offered?  Declined  Menopause- 44yo  Last Mammogram 2023  Colon Screening 2021         reports that she has never smoked. She has never used smokeless tobacco.      Today's PHQ-2 Score:       3/10/2023    11:14 AM   PHQ-2 (  [employer])   Q1: Little interest or pleasure in doing things 0   Q2: Feeling down, depressed or hopeless 0   PHQ-2 Score 0   Q1: Little interest or pleasure in doing things Not at all   Q2: Feeling down, depressed or hopeless Not at all   PHQ-2 Score 0     Today's PHQ-9 Score:       2017     4:46 PM   PHQ-9 SCORE   PHQ-9 Total Score MyChart 0   PHQ-9 Total Score 0     Today's TIANA-7 Score:        No data to display                Problem list and histories reviewed & adjusted, as indicated.  Additional history: as documented.    Patient Active Problem List   Diagnosis    Seasonal allergic rhinitis    CARDIOVASCULAR SCREENING; LDL GOAL LESS THAN 130    Screening for diabetes mellitus    Eczema     Past Surgical History:   Procedure Laterality Date    COLONOSCOPY N/A 1/15/2021    Procedure: COLONOSCOPY;  Surgeon: Jacqueline Mays MD;  [facility]: [facility] ORAL SURGERY PROCEDURE        Social History     Tobacco Use    Smoking status: Never    Smokeless tobacco: Never   Substance Use Topics    Alcohol use: No      No data available              albuterol (PROAIR HFA/PROVENTIL HFA/VENTOLIN  "HFA) 108 (90 Base) MCG/ACT inhaler, Inhale 2 puffs into the lungs every 6 hours as needed for shortness of breath / dyspnea or wheezing  calcium carbonate 500 MG tablet, Take 1 tablet by mouth 2 times daily Reported on 2/13/2017  cetirizine (ZYRTEC) 10 MG tablet, Take 10 mg by mouth daily  Cholecalciferol (VITAMIN D PO), Reported on 2/13/2017  CRANBERRY PO,   Multiple Vitamin (MULTI-VITAMIN) per tablet, Take 1 tablet by mouth daily Reported on 2/13/2017  Cyanocobalamin (VITAMIN B12 PO), Reported on 2/13/2017 (Patient not taking: Reported on 3/10/2023)    No current facility-administered medications on file prior to visit.    Allergies   Allergen Reactions    Ciprofloxacin      Mom had reaction and warned her to note this on her chart.  Had unresolved numbness.    Cefprozil Rash       ROS:  10 Point review of systems negative other noted above in HPI    OBJECTIVE:     /78   Pulse 86   Ht 1.6 m (5' 3\")   Wt 61.1 kg (134 lb 9.6 oz)   LMP 08/02/2013   BMI 23.84 kg/m    Body mass index is 23.84 kg/m .      Gen: Alert, oriented, appropriately interactive, NAD  Eyes: Eyes grossly normal to inspection, conjunctivae and sclerae normal  CV: No edema  Resp: no audible wheeze, cough, or visible cyanosis.  No visible retractions or increased work of breathing.  Able to speak fully in complete sentences.  Breasts: no axillary adenopathy, no dominant masses, no skin changes, no nipple discharge, nontender  Abdomen: soft, non tender, non distended, no masses, no hernias.   External genitalia: no lesions; normal appearing external genitalia, bartholins glands, urethra, skenes glands  Vagina: no masses or lesions or discharge,  atrophic  Cervix: no masses or lesions or discharge   Bimanual exam:   Nontender pelvic floor muscles  Urethra: nontender   Bladder: nontender and without massess, well supported   Uterus: midline, anteverted, small, mobile  no masses, non-tender  Adnexa: no masses or tenderness appreciated   No " cervical motion tenderness  MSK: normal gait, symmetric movements UE & LE  Lower extremities: non-tender, no edema  Neuro: Cranial nerves grossly intact, mentation intact and speech normal  Psych: mentation appears normal, affect normal/bright, judgement and insight intact, normal speech and appearance well-groomed      In-Clinic Test Results:  No results found for this or any previous visit (from the past 24 hour(s)).    ASSESSMENT/PLAN:                                                      Julia Flynn is a 53 year old  who presents today for WWE      ICD-10-CM    1. Well woman exam with routine gynecological exam  Z01.419       2. Cervical cancer screening  Z12.4 Pap Screen with HPV - recommended age 30 - 65 years          Pap with cotesting  Screening mammogram- UTD  Screening colonoscopy- UTD  Flu vaccine- Discussed when they are coming out and how to obtain  COVID vaccine- Discussed upcoming booster      Rebekah Fields DO  [facility]

## 2023-09-01 LAB
BKR LAB AP GYN ADEQUACY: NORMAL
BKR LAB AP GYN INTERPRETATION: NORMAL
BKR LAB AP HPV REFLEX: NORMAL
BKR LAB AP PREVIOUS ABNORMAL: NORMAL
PATH REPORT.COMMENTS IMP SPEC: NORMAL
PATH REPORT.COMMENTS IMP SPEC: NORMAL
PATH REPORT.RELEVANT HX SPEC: NORMAL

## 2023-09-06 LAB
HUMAN PAPILLOMA VIRUS 16 DNA: NEGATIVE
HUMAN PAPILLOMA VIRUS 18 DNA: NEGATIVE
HUMAN PAPILLOMA VIRUS FINAL DIAGNOSIS: NORMAL
HUMAN PAPILLOMA VIRUS OTHER HR: NEGATIVE

## 2024-07-17 ENCOUNTER — OFFICE VISIT (OUTPATIENT)
Dept: OBGYN | Facility: CLINIC | Age: 54
End: 2024-07-17
Payer: COMMERCIAL

## 2024-07-17 ENCOUNTER — TELEPHONE (OUTPATIENT)
Dept: OBGYN | Facility: CLINIC | Age: 54
End: 2024-07-17
Payer: COMMERCIAL

## 2024-07-17 VITALS
BODY MASS INDEX: 22.5 KG/M2 | SYSTOLIC BLOOD PRESSURE: 117 MMHG | WEIGHT: 127 LBS | HEART RATE: 105 BPM | DIASTOLIC BLOOD PRESSURE: 84 MMHG

## 2024-07-17 DIAGNOSIS — N81.6 RECTOCELE: ICD-10-CM

## 2024-07-17 DIAGNOSIS — N81.11 CYSTOCELE, MIDLINE: Primary | ICD-10-CM

## 2024-07-17 PROCEDURE — A4562 PESSARY, NON RUBBER,ANY TYPE: HCPCS | Performed by: OBSTETRICS & GYNECOLOGY

## 2024-07-17 PROCEDURE — 57160 INSERT PESSARY/OTHER DEVICE: CPT | Performed by: OBSTETRICS & GYNECOLOGY

## 2024-07-17 PROCEDURE — 99213 OFFICE O/P EST LOW 20 MIN: CPT | Mod: 25 | Performed by: OBSTETRICS & GYNECOLOGY

## 2024-07-17 PROCEDURE — 99459 PELVIC EXAMINATION: CPT | Performed by: OBSTETRICS & GYNECOLOGY

## 2024-07-17 NOTE — PATIENT INSTRUCTIONS
If you have any questions regarding your visit, Please contact your care team.    [facility]: [phone]      [facility] CLINIC HOURS TELEPHONE NUMBER   Rebekah Fields DO.    DO Lees-Surgery Scheduler  Shelly - Surgery Scheduler    KEVIN Rose RN Kylie, RN     Monday, Thursday  [place]  7am-3pm    Tuesday, Wednesday  [place]  7am-3pm    Friday  [place]  1pm-3:30pm    Typical Surgery Days: Thursday or Friday   [facility]  [address] [place] [address] [phone] Phone  [phone] Fax    [facility]  [address] [place] [address] [phone] Phone    Imaging [phone] Phone    [facility] Labor and Delivery:  [phone] Phone     **Surgeries** Our Surgery Schedulers will contact you to schedule. If you do not receive a call within 3 business days, please call [phone].    Urgent Care locations:  [facility] Saturday and    9 am - 5 pm    Monday-Friday   12 pm - 8 pm  Saturday and    9 am - 5 pm   [phone]       If you need a medication refill, please contact your pharmacy. Please allow 3 business days for your refill to be completed.  As always, Thank you for trusting us with your healthcare needs!

## 2024-07-17 NOTE — TELEPHONE ENCOUNTER
"Pt was last seen by Dr. Fields on 8/29/23 for a yearly physical.  Pt is scheduled to see Dr. Fields on 9/10/24 for possible prolapse.     Pt is wanting to know if there is something she can do in the meantime prior to her appt like PT.      Spoke with pt to find out what symptoms she is experiencing.  She is only experiencing a \"heaviness\" in her pelvic region.  She states in the mornings she feels fine but as the day progresses the heaviness increases. She is very active in the summer.    Denies anything prolapsing out of her vaginal os.  Denies pain with intercourse.  Denies vaginal bleeding.  Denies difficulty with BMs or urination.    I let her know that Dr. Fields will most likely not be able to place PT referral until she further evaluates her to determine if she has a prolapse and what kind of prolapse.  I did suggest I put her on Dr. Fields's wait list or find a sooner appt with a different provider.    Pt wanted me to send a message to Dr. Fields to see what she recommends.    Rose Webb RN    "

## 2024-07-17 NOTE — TELEPHONE ENCOUNTER
[facility]    Phone Message    May a detailed message be left on voicemail: yes     Reason for Call: Other: . Pt is calling in, she has symptoms of a prolapse, writer scheduled next opening with  on 9/10/24- as that is the soonest she could come in- pt is wondering if she can start on physical therapy, or any remedies she can try in the meantime, please call pt, thank you     Action Taken: Message routed to:  Other: obgyn    Travel Screening: Not Applicable     Date of Service:

## 2024-07-17 NOTE — TELEPHONE ENCOUNTER
Pt was last seen by Dr. Fields on 8/29/23 for a yearly physical.  Pt is scheduled to see Dr. Fields on 9/10/24 for possible prolapse.    Pt is wanting to know if there is something she can do in the meantime prior to her appt like PT.    Attempted to reach pt by phone to discuss her symptoms.    Unable to reach patient via phone. Left message to call back at [phone].    Rose Webb RN

## 2024-07-17 NOTE — TELEPHONE ENCOUNTER
"Rebekah Fields, DO  [facility] Ob[facility]Gyn Triage  Caller: Unspecified (Today,  8:12 AM)  \"Agree with RN. Unable to place PT referral at this time as I do not have a working diagnosis. Can place at time of visit however. If she would like to research exercises that she can try at home before the visit, I would recommend going to the intimate tarah website ([email or web address]) as they have many educational videos for her symptoms.  [email or web address]\"    Relayed above message to pt.    Also added pt to Dr. Fields's MG, AN and PH wait list.    Rose Webb RN        "

## 2024-07-17 NOTE — PROGRESS NOTES
SUBJECTIVE:       HPI: Julia Flynn is a 54 year old  who presents today for consultation for pelvic organ prolapse, referral from self.     Patient has noticed vaginal pressure.  It is most noticeable at the end of the day. No bulging. She has no urinary incontinence. She has no bowel incontinence. She does not have to splint to have a BM or void.     She has had 3 children, all via vaginal delivery. Largest baby was [id number]. Unknown history of traumatic delivery, forceps or vacuum. Unknown hx of OASIS injury.  Patient is sexually active. One male partner    No prior pelvic surgery.    Denies dysuria, hematuria, constipation or diarrhea.      Gyn Hx: Patient's last menstrual period was 2013.     Last pap was 2023 nil neg hpv         Problem list and histories reviewed & adjusted, as indicated.  Additional history: as documented.    Patient Active Problem List   Diagnosis    Seasonal allergic rhinitis    CARDIOVASCULAR SCREENING; LDL GOAL LESS THAN 130    Screening for diabetes mellitus    Eczema     Past Surgical History:   Procedure Laterality Date    COLONOSCOPY N/A 1/15/2021    Procedure: COLONOSCOPY;  Surgeon: Jacqueline Mays MD;  Location: [facility] ORAL SURGERY PROCEDURE        Social History     Tobacco Use    Smoking status: Never    Smokeless tobacco: Never   Substance Use Topics    Alcohol use: No      No data available              Current Outpatient Medications   Medication Sig Dispense Refill    albuterol (PROAIR HFA/PROVENTIL HFA/VENTOLIN HFA) 108 (90 Base) MCG/ACT inhaler Inhale 2 puffs into the lungs every 6 hours as needed for shortness of breath / dyspnea or wheezing 1 Inhaler 0    calcium carbonate 500 MG tablet Take 1 tablet by mouth 2 times daily Reported on 2017      cetirizine (ZYRTEC) 10 MG tablet Take 10 mg by mouth daily      Cholecalciferol (VITAMIN D PO) Reported on 2017      CRANBERRY PO       Multiple Vitamin (MULTI-VITAMIN) per tablet Take 1 tablet by  mouth daily Reported on 2/13/2017      Cyanocobalamin (VITAMIN B12 PO) Reported on 2/13/2017 (Patient not taking: Reported on 7/17/2024)       No current facility-administered medications for this visit.     Allergies   Allergen Reactions    Ciprofloxacin      Mom had reaction and warned her to note this on her chart.  Had unresolved numbness.    Cefprozil Rash       ROS:  10 Point review of systems negative other noted above in HPI    OBJECTIVE:     /84   Pulse 105   Wt 57.6 kg (127 lb)   LMP 08/02/2013   BMI 22.50 kg/m    Body mass index is 22.5 kg/m .      Gen: Alert, oriented, appropriately interactive, NAD  Eyes: Eyes grossly normal to inspection, conjunctivae and sclerae normal  Resp: no audible wheeze, cough, or visible cyanosis.  No visible retractions or increased work of breathing.  Able to speak fully in complete sentences.  Abdomen: soft, non tender, non distended  External genitalia: no lesions; normal appearing external genitalia, bartholins glands, urethra, skenes glands  Vagina: no masses or lesions or discharge, atrophic. Anterior descent extends to hymenal ring when bearing down. Posterior descent extends to -1cm from hymenal ring when bearing down. Apical descent minimal. Neg CST.  Cervix: no masses or lesions or discharge   Bimanual exam:   Nontender pelvic floor muscles  Urethra: nontender   Bladder: nontender and without massess, well supported   Uterus: midline, anteverted, small, mobile  no masses, non-tender  Adnexa: no masses or tenderness appreciated   No cervical motion tenderness  MSK: normal gait, symmetric movements UE & LE  Lower extremities: non-tender, no edema  Neuro: Cranial nerves grossly intact, mentation intact and speech normal  Psych: mentation appears normal, affect normal/bright, judgement and insight intact, normal speech and appearance well-groomed     In-Clinic Test Results:  No results found for this or any previous visit (from the past 24  hour(s)).    ASSESSMENT/PLAN:                                                      Julia Flynn is a 54 year old  who presents today for consultation for pelvic organ prolapse, referral from self      ICD-10-CM    1. Cystocele, midline  N81.11 Physical Therapy  Referral     FIT/INSERT INTRAVAG SUPPORT DEVICE/PESSARY     PESSARY, REUSABLE, NON RUBBER,ANY TYPE      2. Rectocele  N81.6 Physical Therapy  Referral     FIT/INSERT INTRAVAG SUPPORT DEVICE/PESSARY     PESSARY, REUSABLE, NON RUBBER,ANY TYPE          Discussed treatment options for pelvic organ prolapse, including conservative options in form of pelvic PT and/or pessary versus surgical options. Patient desires to try Pelvic PT and pessary at this time.   Size 3 Ring Pessary placed in clinic today without complication. Patient tolerated well.     Patient was educated on use: remove as needed or every 3-6 months for cleaning, remove before sexual intercourse, and was educated on warning signs: pain, bleeding, abnormal discharge, change in immunocompetent status.       Rebekah Fields [facility]       Pessary fitting     The appropriate size pessary was fitted with the desired support result.  Pt was very comfortable with the device in place.  Pt was allowed to work thru multiple position changes and to make sure she was able to void prior to leaving.  With the placement being successful, she will follow up with us in 3-4 months, sooner with any concerns. If at any time she has pain, severe bleeding, or is unable to void she is to seek immediate medical attention.  All questions answered.  Size 3 Ring Pessary

## 2024-08-07 ENCOUNTER — THERAPY VISIT (OUTPATIENT)
Dept: PHYSICAL THERAPY | Facility: CLINIC | Age: 54
End: 2024-08-07
Attending: OBSTETRICS & GYNECOLOGY
Payer: COMMERCIAL

## 2024-08-07 DIAGNOSIS — N81.6 RECTOCELE: ICD-10-CM

## 2024-08-07 DIAGNOSIS — N81.11 CYSTOCELE, MIDLINE: ICD-10-CM

## 2024-08-07 PROCEDURE — 97161 PT EVAL LOW COMPLEX 20 MIN: CPT | Mod: GP | Performed by: PHYSICAL THERAPIST

## 2024-08-07 PROCEDURE — 97530 THERAPEUTIC ACTIVITIES: CPT | Mod: GP | Performed by: PHYSICAL THERAPIST

## 2024-08-07 PROCEDURE — 97110 THERAPEUTIC EXERCISES: CPT | Mod: GP | Performed by: PHYSICAL THERAPIST

## 2024-08-07 NOTE — PROGRESS NOTES
PHYSICAL THERAPY EVALUATION  Type of Visit: Evaluation             Subjective       Pt reports increased pelvic floor pressure, reports this slowly starting a couple years ago.  Pt reports increase with activity.  Pt reports no consistency, rates severity <5/10.  Pt denies being able to feel anything external.   Pt has been independently doing kegels with some improvement.  Patient was fit with a pessary but has not used yet.  Presenting condition or subjective complaint: Pt to strengthen pelvic floor  Date of onset: 07/01/24    Relevant medical history:     Dates & types of surgery:      Prior diagnostic imaging/testing results:       Prior therapy history for the same diagnosis, illness or injury: No        Living Environment  Social support: With a significant other or spouse   Type of home: House   Stairs to enter the home: Yes         Employment:     for [employer]  Hobbies/Interests:      Patient goals for therapy:  improve pelvic pressure    Pain assessment: none     Objective      PELVIC EVALUATION  ADDITIONAL HISTORY:  Sex assigned at birth: Female  Gender identity: Female    Pronouns: She/Her Hers      Bladder History:  Feels bladder filling: Yes  Triggers for feeling of inability to wait to go to the bathroom: No    How long can you wait to urinate: Couple hours  Gets up at night to urinate: Yes 1-2  Can stop the flow of urine when urinating: Yes  Volume of urine usually released: Average   Other issues:    Number of bladder infections in last 12 months:    Fluid intake per day:      water: approx 40oz; caffinated beverage 16-24oz  Medications taken for bladder:     none  Activities causing urine leak:    reports post-void dribbling a few times a week  Amount of urine typically leaked:  none  Pads used to help with leaking: No        Bowel History:  Frequency of bowel movement: Daily  Consistency of stool: Soft-formed    Ignores the urge to defecate: No  Other bowel issues:    Length of  time spent trying to have a bowel movement:  denies needing to strain    Sexual Function History:  Sexual orientation: Straight    Sexually active: Yes  Lubrication used: Yes Yes  Pelvic pain:      Pain or difficulty with orgasms/erection/ejaculation:      State of menopause:    Hormone medications:        Are you currently pregnant: No  Number of previous pregnancies: 3  Number of deliveries: 3  If you have delivered before, did you have any of these issues during delivery: Vaginal delivery  Have you been diagnosed with pelvic prolapse or abdominal separation: Yes  Do you get regular exercise: Yes  I do this type of exercise: Walking kegals pilates  Have you tried pelvic floor strengthening exercises for 4 weeks: No  Do you have any history of trauma that is relevant to your care that you d like to share: No      Discussed reason for referral regarding pelvic health needs and external/internal pelvic floor muscle examination with patient/guardian.  Opportunity provided to ask questions and verbal consent for assessment and intervention was given.        PELVIC EXAM  External Visual Inspection:  At rest: Normal  With voluntary pelvic floor contraction: Present  Relaxation of PFM: Yes    Integumentary:   Normal    External Digital Palpation per Perineum:   Ischiocavernosis: Unremarkable  Bulbo cavernosis: Unremarkable  Transverse perineal: Unremarkable  Levator ani: Unremarkable  Perineal body: Unremarkable  Coccyx: Unremarkable  Public symphysis: Unremarkable      Internal Digital Palpation:  Per Vagina:  Digital Muscle Performance: P (Power): 3-  E (Endurance): 6  R (Repetitions): 10  F (Fast Twitch): 4  Relaxation Post-Contraction: Partial/delayed relaxation        Pelvic Organ Prolapse:   Anterior: At the level of the hymen  Apical: At the level of the hymen  Posterior: At the level of the hymen      Assessment & Plan   CLINICAL IMPRESSIONS  Medical Diagnosis: Cystocele, midline  Rectocele    Treatment Diagnosis:  Cystocele, midline  Rectocele   Impression/Assessment: Patient is a 54 year old female with prolapse complaints.  The following significant findings have been identified: Impaired muscle performance and Decreased activity tolerance. These impairments interfere with their ability to perform self care tasks, work tasks, recreational activities, and household chores as compared to previous level of function.     Clinical Decision Making (Complexity):  Clinical Presentation: Stable/Uncomplicated  Clinical Presentation Rationale: based on medical and personal factors listed in PT evaluation  Clinical Decision Making (Complexity): Low complexity    PLAN OF CARE  Treatment Interventions:  Modalities: Biofeedback  Interventions: Manual Therapy, Neuromuscular Re-education, Therapeutic Activity, Therapeutic Exercise, Self-Care/Home Management    Long Term Goals            Frequency of Treatment: 8 visits  Duration of Treatment: 10 weeks    Recommended Referrals to Other Professionals:  None indicated  Education Assessment:   Learner/Method: Patient;Listening;Demonstration;Pictures/Video;No Barriers to Learning    Risks and benefits of evaluation/treatment have been explained.   Patient/Family/caregiver agrees with Plan of Care.     Evaluation Time:             Signing Clinician: Trudy Rosario, PT

## 2024-08-13 ENCOUNTER — THERAPY VISIT (OUTPATIENT)
Dept: PHYSICAL THERAPY | Facility: CLINIC | Age: 54
End: 2024-08-13
Attending: OBSTETRICS & GYNECOLOGY
Payer: COMMERCIAL

## 2024-08-13 DIAGNOSIS — N81.11 CYSTOCELE, MIDLINE: Primary | ICD-10-CM

## 2024-08-13 DIAGNOSIS — N81.6 RECTOCELE: ICD-10-CM

## 2024-08-13 PROCEDURE — 97110 THERAPEUTIC EXERCISES: CPT | Mod: GP | Performed by: PHYSICAL THERAPIST

## 2024-11-04 ENCOUNTER — HOSPITAL ENCOUNTER (OUTPATIENT)
Dept: MAMMOGRAPHY | Facility: CLINIC | Age: 54
Discharge: HOME | End: 2024-11-04
Admitting: PEDIATRICS
Payer: COMMERCIAL

## 2024-11-04 DIAGNOSIS — Z12.31 VISIT FOR SCREENING MAMMOGRAM: ICD-10-CM

## 2024-11-04 PROCEDURE — 77063 BREAST TOMOSYNTHESIS BI: CPT

## 2024-11-10 ENCOUNTER — HEALTH MAINTENANCE LETTER (OUTPATIENT)
Age: 54
End: 2024-11-10